# Patient Record
Sex: FEMALE | Race: WHITE | Employment: STUDENT | URBAN - METROPOLITAN AREA
[De-identification: names, ages, dates, MRNs, and addresses within clinical notes are randomized per-mention and may not be internally consistent; named-entity substitution may affect disease eponyms.]

---

## 2017-03-07 ENCOUNTER — OFFICE VISIT (OUTPATIENT)
Dept: FAMILY MEDICINE CLINIC | Age: 22
End: 2017-03-07

## 2017-03-07 VITALS
SYSTOLIC BLOOD PRESSURE: 104 MMHG | HEIGHT: 64 IN | RESPIRATION RATE: 18 BRPM | DIASTOLIC BLOOD PRESSURE: 60 MMHG | TEMPERATURE: 97.3 F | BODY MASS INDEX: 21.03 KG/M2 | HEART RATE: 94 BPM | WEIGHT: 123.2 LBS

## 2017-03-07 DIAGNOSIS — M25.571 ACUTE RIGHT ANKLE PAIN: ICD-10-CM

## 2017-03-07 DIAGNOSIS — F90.8 ATTENTION-DEFICIT HYPERACTIVITY DISORDER, OTHER TYPE: ICD-10-CM

## 2017-03-07 DIAGNOSIS — Z72.0 TOBACCO ABUSE: Primary | ICD-10-CM

## 2017-03-07 PROCEDURE — 99214 OFFICE O/P EST MOD 30 MIN: CPT | Performed by: FAMILY MEDICINE

## 2017-03-07 RX ORDER — DEXTROAMPHETAMINE SACCHARATE, AMPHETAMINE ASPARTATE MONOHYDRATE, DEXTROAMPHETAMINE SULFATE AND AMPHETAMINE SULFATE 3.75; 3.75; 3.75; 3.75 MG/1; MG/1; MG/1; MG/1
15 CAPSULE, EXTENDED RELEASE ORAL DAILY
Qty: 30 CAPSULE | Refills: 0 | Status: SHIPPED | OUTPATIENT
Start: 2017-03-07 | End: 2017-10-25 | Stop reason: SDUPTHER

## 2017-03-07 RX ORDER — NICOTINE 21 MG/24HR
1 PATCH, TRANSDERMAL 24 HOURS TRANSDERMAL EVERY 24 HOURS
Qty: 30 PATCH | Refills: 3 | Status: SHIPPED | OUTPATIENT
Start: 2017-03-07 | End: 2017-10-25

## 2017-03-07 ASSESSMENT — PATIENT HEALTH QUESTIONNAIRE - PHQ9
2. FEELING DOWN, DEPRESSED OR HOPELESS: 0
SUM OF ALL RESPONSES TO PHQ QUESTIONS 1-9: 0
1. LITTLE INTEREST OR PLEASURE IN DOING THINGS: 0
SUM OF ALL RESPONSES TO PHQ9 QUESTIONS 1 & 2: 0

## 2017-10-25 ENCOUNTER — HOSPITAL ENCOUNTER (OUTPATIENT)
Dept: GENERAL RADIOLOGY | Age: 22
Discharge: HOME | End: 2017-10-25
Payer: COMMERCIAL

## 2017-10-25 ENCOUNTER — OFFICE VISIT (OUTPATIENT)
Dept: FAMILY MEDICINE CLINIC | Age: 22
End: 2017-10-25

## 2017-10-25 VITALS
DIASTOLIC BLOOD PRESSURE: 78 MMHG | WEIGHT: 130 LBS | TEMPERATURE: 97.5 F | HEIGHT: 64 IN | BODY MASS INDEX: 22.2 KG/M2 | SYSTOLIC BLOOD PRESSURE: 122 MMHG | RESPIRATION RATE: 18 BRPM | HEART RATE: 92 BPM

## 2017-10-25 DIAGNOSIS — G89.29 CHRONIC LEFT-SIDED LOW BACK PAIN WITH LEFT-SIDED SCIATICA: ICD-10-CM

## 2017-10-25 DIAGNOSIS — Z20.2 CHLAMYDIA CONTACT, TREATED: ICD-10-CM

## 2017-10-25 DIAGNOSIS — F90.8 ATTENTION DEFICIT HYPERACTIVITY DISORDER (ADHD), OTHER TYPE: ICD-10-CM

## 2017-10-25 DIAGNOSIS — M54.42 CHRONIC LEFT-SIDED LOW BACK PAIN WITH LEFT-SIDED SCIATICA: Primary | ICD-10-CM

## 2017-10-25 DIAGNOSIS — G89.29 CHRONIC LEFT-SIDED LOW BACK PAIN WITH LEFT-SIDED SCIATICA: Primary | ICD-10-CM

## 2017-10-25 DIAGNOSIS — M54.42 CHRONIC LEFT-SIDED LOW BACK PAIN WITH LEFT-SIDED SCIATICA: ICD-10-CM

## 2017-10-25 PROCEDURE — 99214 OFFICE O/P EST MOD 30 MIN: CPT | Performed by: FAMILY MEDICINE

## 2017-10-25 PROCEDURE — 72110 X-RAY EXAM L-2 SPINE 4/>VWS: CPT

## 2017-10-25 RX ORDER — DEXTROAMPHETAMINE SACCHARATE, AMPHETAMINE ASPARTATE MONOHYDRATE, DEXTROAMPHETAMINE SULFATE AND AMPHETAMINE SULFATE 3.75; 3.75; 3.75; 3.75 MG/1; MG/1; MG/1; MG/1
15 CAPSULE, EXTENDED RELEASE ORAL DAILY
Qty: 30 CAPSULE | Refills: 0 | Status: SHIPPED | OUTPATIENT
Start: 2017-10-25 | End: 2018-01-26 | Stop reason: SDUPTHER

## 2017-10-25 NOTE — PROGRESS NOTES
Back Pain: Patient presents for presents evaluation of low back problems. Symptoms have been present for 1 year and include Initial inciting event: none. Symptoms are worst: . Alleviating factors identifiable by patient are nothing. Exacerbating factors identifiable by patient are none. Treatments so far initiated by patient: heat Previous lower back problems: none. Previous workup: none. Previous treatments: none. There is no injury. Patient also complains of white spots on her stomach. First noticed them in August. Per patient they do not itch. Her ADHD is well controlled on current medication and dose. She was recently treated for a chlamydia infection like to know if that has cleared. EXAM:  Constitutional Blood pressure 122/78, pulse 92, temperature 97.5 °F (36.4 °C), temperature source Temporal, resp. rate 18, height 5' 4\" (1.626 m), weight 130 lb (59 kg). .   Physical Exam   Constitutional: She is oriented to person, place, and time. She appears well-developed and well-nourished. Cardiovascular: Normal rate, regular rhythm and normal heart sounds. Pulmonary/Chest: Effort normal and breath sounds normal.   Musculoskeletal:   Tenderness in the inferior lumbar spine and sacroiliac joint areas. Straight leg raising negative. Neurological: She is alert and oriented to person, place, and time. DIAGNOSIS:   1. Chronic left-sided low back pain with left-sided sciatica  XR LUMBAR SPINE (MIN 4 VIEWS)    Referral Unknown External Chiropractic    Symptomatic, treat and refer for chiropractic services, Dr. Narda Nguyen. 2. Attention deficit hyperactivity disorder (ADHD), other type  amphetamine-dextroamphetamine (ADDERALL XR) 15 MG extended release capsule    amphetamine-dextroamphetamine (ADDERALL XR) 15 MG extended release capsule    amphetamine-dextroamphetamine (ADDERALL XR) 15 MG extended release capsule    Well controlled, continue current medication.    3. Chlamydia contact, treated  C.

## 2017-10-25 NOTE — Clinical Note
Please send a copy of the lumbar spine x-ray reports to Dr. Ad Arreaga, the chiropractor the patient has been referred to and will be seeing soon.

## 2017-10-31 LAB
C. TRACHOMATIS DNA ,URINE: NEGATIVE
N. GONORRHOEAE DNA, URINE: NEGATIVE

## 2018-01-13 ENCOUNTER — OFFICE VISIT (OUTPATIENT)
Dept: FAMILY MEDICINE CLINIC | Age: 23
End: 2018-01-13

## 2018-01-13 VITALS
SYSTOLIC BLOOD PRESSURE: 112 MMHG | RESPIRATION RATE: 12 BRPM | BODY MASS INDEX: 22.49 KG/M2 | OXYGEN SATURATION: 98 % | HEART RATE: 64 BPM | TEMPERATURE: 97.4 F | DIASTOLIC BLOOD PRESSURE: 78 MMHG | WEIGHT: 131 LBS

## 2018-01-13 DIAGNOSIS — H66.001 ACUTE SUPPURATIVE OTITIS MEDIA OF RIGHT EAR WITHOUT SPONTANEOUS RUPTURE OF TYMPANIC MEMBRANE, RECURRENCE NOT SPECIFIED: Primary | ICD-10-CM

## 2018-01-13 PROCEDURE — 99213 OFFICE O/P EST LOW 20 MIN: CPT | Performed by: FAMILY MEDICINE

## 2018-01-13 RX ORDER — AMOXICILLIN 875 MG/1
875 TABLET, COATED ORAL 2 TIMES DAILY
Qty: 20 TABLET | Refills: 0 | Status: SHIPPED | OUTPATIENT
Start: 2018-01-13 | End: 2018-01-23

## 2018-01-19 ENCOUNTER — OFFICE VISIT (OUTPATIENT)
Dept: FAMILY MEDICINE CLINIC | Age: 23
End: 2018-01-19

## 2018-01-19 VITALS
BODY MASS INDEX: 22.2 KG/M2 | HEART RATE: 74 BPM | DIASTOLIC BLOOD PRESSURE: 76 MMHG | WEIGHT: 130 LBS | HEIGHT: 64 IN | TEMPERATURE: 97.4 F | SYSTOLIC BLOOD PRESSURE: 114 MMHG | RESPIRATION RATE: 16 BRPM

## 2018-01-19 DIAGNOSIS — H66.001 ACUTE SUPPURATIVE OTITIS MEDIA OF RIGHT EAR WITHOUT SPONTANEOUS RUPTURE OF TYMPANIC MEMBRANE, RECURRENCE NOT SPECIFIED: Primary | ICD-10-CM

## 2018-01-19 PROCEDURE — 99213 OFFICE O/P EST LOW 20 MIN: CPT | Performed by: FAMILY MEDICINE

## 2018-01-19 RX ORDER — FLUTICASONE PROPIONATE 50 MCG
1 SPRAY, SUSPENSION (ML) NASAL DAILY
Qty: 1 BOTTLE | Refills: 1 | Status: SHIPPED | OUTPATIENT
Start: 2018-01-19 | End: 2018-08-24

## 2018-01-19 NOTE — PROGRESS NOTES
Chief Complaint   Patient presents with    Ear Fullness       HPI: Korina Wiley is a 25 y.o. female presenting for evaluation of right ear fullness. Patient states that she can't hear out of right and she feels just a little pressure. She is taking the amoxicillin but cannot hear. The swelling and pain have diminished, but she cannot hear in the right ear. EXAM:  Constitutional Blood pressure 114/76, pulse 74, temperature 97.4 °F (36.3 °C), temperature source Temporal, resp. rate 16, height 5' 4\" (1.626 m), weight 130 lb (59 kg). Physical Exam   HENT:   Head: Normocephalic. Right Ear: Ear canal normal. Tympanic membrane is erythematous and bulging. Left Ear: Ear canal normal. Tympanic membrane is not erythematous and not bulging. Mouth/Throat: Uvula is midline and oropharynx is clear and moist.   Cardiovascular: Normal rate and regular rhythm. Pulmonary/Chest: Effort normal and breath sounds normal.       DIAGNOSIS:   1. Acute suppurative otitis media of right ear without spontaneous rupture of tympanic membrane, recurrence not specified  fluticasone (FLONASE) 50 MCG/ACT nasal spray     Plan for follow up: Follow up in scheduled time with blood work as ordered. Other follow up as needed.       Electronically signed by Antonio Snow, 8:12 PM 1/21/18

## 2018-01-26 ENCOUNTER — OFFICE VISIT (OUTPATIENT)
Dept: FAMILY MEDICINE CLINIC | Age: 23
End: 2018-01-26
Payer: COMMERCIAL

## 2018-01-26 VITALS
RESPIRATION RATE: 12 BRPM | BODY MASS INDEX: 21.68 KG/M2 | WEIGHT: 127 LBS | TEMPERATURE: 97.6 F | SYSTOLIC BLOOD PRESSURE: 94 MMHG | HEIGHT: 64 IN | DIASTOLIC BLOOD PRESSURE: 58 MMHG | HEART RATE: 66 BPM

## 2018-01-26 DIAGNOSIS — F90.8 ATTENTION DEFICIT HYPERACTIVITY DISORDER (ADHD), OTHER TYPE: Primary | ICD-10-CM

## 2018-01-26 DIAGNOSIS — H66.001 ACUTE SUPPURATIVE OTITIS MEDIA OF RIGHT EAR WITHOUT SPONTANEOUS RUPTURE OF TYMPANIC MEMBRANE, RECURRENCE NOT SPECIFIED: ICD-10-CM

## 2018-01-26 PROCEDURE — 99213 OFFICE O/P EST LOW 20 MIN: CPT | Performed by: FAMILY MEDICINE

## 2018-01-26 RX ORDER — DEXTROAMPHETAMINE SACCHARATE, AMPHETAMINE ASPARTATE MONOHYDRATE, DEXTROAMPHETAMINE SULFATE AND AMPHETAMINE SULFATE 3.75; 3.75; 3.75; 3.75 MG/1; MG/1; MG/1; MG/1
15 CAPSULE, EXTENDED RELEASE ORAL DAILY
Qty: 30 CAPSULE | Refills: 0 | Status: SHIPPED | OUTPATIENT
Start: 2018-03-27 | End: 2018-08-24 | Stop reason: SDUPTHER

## 2018-01-26 RX ORDER — DEXTROAMPHETAMINE SACCHARATE, AMPHETAMINE ASPARTATE MONOHYDRATE, DEXTROAMPHETAMINE SULFATE AND AMPHETAMINE SULFATE 3.75; 3.75; 3.75; 3.75 MG/1; MG/1; MG/1; MG/1
15 CAPSULE, EXTENDED RELEASE ORAL DAILY
Qty: 30 CAPSULE | Refills: 0 | Status: SHIPPED | OUTPATIENT
Start: 2018-02-25 | End: 2018-08-24 | Stop reason: SDUPTHER

## 2018-01-26 RX ORDER — DEXTROAMPHETAMINE SACCHARATE, AMPHETAMINE ASPARTATE MONOHYDRATE, DEXTROAMPHETAMINE SULFATE AND AMPHETAMINE SULFATE 3.75; 3.75; 3.75; 3.75 MG/1; MG/1; MG/1; MG/1
15 CAPSULE, EXTENDED RELEASE ORAL DAILY
Qty: 30 CAPSULE | Refills: 0 | Status: SHIPPED | OUTPATIENT
Start: 2018-01-26 | End: 2018-08-24 | Stop reason: SDUPTHER

## 2018-01-26 RX ORDER — CLARITHROMYCIN 250 MG/1
250 TABLET, FILM COATED ORAL 2 TIMES DAILY
Qty: 20 TABLET | Refills: 0 | Status: SHIPPED | OUTPATIENT
Start: 2018-01-26 | End: 2018-02-05

## 2018-03-07 ENCOUNTER — OFFICE VISIT (OUTPATIENT)
Dept: FAMILY MEDICINE CLINIC | Age: 23
End: 2018-03-07
Payer: COMMERCIAL

## 2018-03-07 VITALS
HEIGHT: 64 IN | TEMPERATURE: 98.2 F | BODY MASS INDEX: 22.71 KG/M2 | HEART RATE: 77 BPM | DIASTOLIC BLOOD PRESSURE: 78 MMHG | RESPIRATION RATE: 14 BRPM | SYSTOLIC BLOOD PRESSURE: 118 MMHG | WEIGHT: 133 LBS

## 2018-03-07 DIAGNOSIS — F41.9 ANXIETY: ICD-10-CM

## 2018-03-07 DIAGNOSIS — F90.9 ATTENTION DEFICIT HYPERACTIVITY DISORDER (ADHD), UNSPECIFIED ADHD TYPE: Primary | ICD-10-CM

## 2018-03-07 PROCEDURE — 99213 OFFICE O/P EST LOW 20 MIN: CPT | Performed by: FAMILY MEDICINE

## 2018-08-16 ENCOUNTER — TELEPHONE (OUTPATIENT)
Dept: FAMILY MEDICINE CLINIC | Age: 23
End: 2018-08-16

## 2018-08-16 DIAGNOSIS — F90.8 ATTENTION DEFICIT HYPERACTIVITY DISORDER (ADHD), OTHER TYPE: ICD-10-CM

## 2018-08-17 RX ORDER — DEXTROAMPHETAMINE SACCHARATE, AMPHETAMINE ASPARTATE MONOHYDRATE, DEXTROAMPHETAMINE SULFATE AND AMPHETAMINE SULFATE 3.75; 3.75; 3.75; 3.75 MG/1; MG/1; MG/1; MG/1
15 CAPSULE, EXTENDED RELEASE ORAL DAILY
Qty: 30 CAPSULE | Refills: 0 | OUTPATIENT
Start: 2018-08-17 | End: 2018-09-16

## 2018-08-24 ENCOUNTER — TELEPHONE (OUTPATIENT)
Dept: FAMILY MEDICINE CLINIC | Age: 23
End: 2018-08-24

## 2018-08-24 ENCOUNTER — OFFICE VISIT (OUTPATIENT)
Dept: FAMILY MEDICINE CLINIC | Age: 23
End: 2018-08-24
Payer: COMMERCIAL

## 2018-08-24 VITALS
HEIGHT: 64 IN | TEMPERATURE: 97.2 F | SYSTOLIC BLOOD PRESSURE: 102 MMHG | RESPIRATION RATE: 12 BRPM | HEART RATE: 63 BPM | DIASTOLIC BLOOD PRESSURE: 80 MMHG | OXYGEN SATURATION: 97 % | BODY MASS INDEX: 22.2 KG/M2 | WEIGHT: 130 LBS

## 2018-08-24 DIAGNOSIS — F90.8 ATTENTION DEFICIT HYPERACTIVITY DISORDER (ADHD), OTHER TYPE: ICD-10-CM

## 2018-08-24 PROCEDURE — 99213 OFFICE O/P EST LOW 20 MIN: CPT | Performed by: FAMILY MEDICINE

## 2018-08-24 RX ORDER — DEXTROAMPHETAMINE SACCHARATE, AMPHETAMINE ASPARTATE MONOHYDRATE, DEXTROAMPHETAMINE SULFATE AND AMPHETAMINE SULFATE 3.75; 3.75; 3.75; 3.75 MG/1; MG/1; MG/1; MG/1
15 CAPSULE, EXTENDED RELEASE ORAL DAILY
Qty: 30 CAPSULE | Refills: 0 | Status: SHIPPED | OUTPATIENT
Start: 2018-09-23 | End: 2019-02-19

## 2018-08-24 RX ORDER — DEXTROAMPHETAMINE SACCHARATE, AMPHETAMINE ASPARTATE MONOHYDRATE, DEXTROAMPHETAMINE SULFATE AND AMPHETAMINE SULFATE 3.75; 3.75; 3.75; 3.75 MG/1; MG/1; MG/1; MG/1
15 CAPSULE, EXTENDED RELEASE ORAL DAILY
Qty: 30 CAPSULE | Refills: 0 | Status: SHIPPED | OUTPATIENT
Start: 2018-10-23 | End: 2019-02-19

## 2018-08-24 RX ORDER — DEXTROAMPHETAMINE SACCHARATE, AMPHETAMINE ASPARTATE MONOHYDRATE, DEXTROAMPHETAMINE SULFATE AND AMPHETAMINE SULFATE 3.75; 3.75; 3.75; 3.75 MG/1; MG/1; MG/1; MG/1
15 CAPSULE, EXTENDED RELEASE ORAL DAILY
Qty: 30 CAPSULE | Refills: 0 | Status: SHIPPED | OUTPATIENT
Start: 2018-08-24 | End: 2019-02-19

## 2018-08-24 ASSESSMENT — PATIENT HEALTH QUESTIONNAIRE - PHQ9
2. FEELING DOWN, DEPRESSED OR HOPELESS: 0
SUM OF ALL RESPONSES TO PHQ9 QUESTIONS 1 & 2: 0
1. LITTLE INTEREST OR PLEASURE IN DOING THINGS: 0
SUM OF ALL RESPONSES TO PHQ QUESTIONS 1-9: 0
SUM OF ALL RESPONSES TO PHQ QUESTIONS 1-9: 0

## 2018-12-24 ENCOUNTER — OFFICE VISIT (OUTPATIENT)
Dept: FAMILY MEDICINE CLINIC | Age: 23
End: 2018-12-24
Payer: COMMERCIAL

## 2018-12-24 VITALS
SYSTOLIC BLOOD PRESSURE: 120 MMHG | WEIGHT: 141.6 LBS | TEMPERATURE: 97.8 F | OXYGEN SATURATION: 97 % | DIASTOLIC BLOOD PRESSURE: 82 MMHG | HEIGHT: 64 IN | RESPIRATION RATE: 16 BRPM | HEART RATE: 77 BPM | BODY MASS INDEX: 24.17 KG/M2

## 2018-12-24 DIAGNOSIS — K59.1 FUNCTIONAL DIARRHEA: ICD-10-CM

## 2018-12-24 DIAGNOSIS — F41.9 ANXIETY: Primary | ICD-10-CM

## 2018-12-24 PROCEDURE — 99213 OFFICE O/P EST LOW 20 MIN: CPT | Performed by: FAMILY MEDICINE

## 2018-12-24 RX ORDER — NORGESTIMATE AND ETHINYL ESTRADIOL 0.25-0.035
KIT ORAL
Refills: 1 | COMMUNITY
Start: 2018-10-30

## 2018-12-24 NOTE — PROGRESS NOTES
Chief Complaint   Patient presents with    GI Problem    Diarrhea     HPI: Loly Andersen is a 26 y.o. female presenting for evaluation of stomach problems and diarrhea. This has been going on for 2 weeks. She feels constantly hungry and eats frequently. She feels nervous  And has a job she does not like. This may be the stress that has her eating a lot. 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 She also states that she has white spots on her skin. EXAM:  Constitutional Blood pressure 120/82, pulse 77, temperature 97.8 °F (36.6 °C), temperature source Temporal, resp. rate 16, height 5' 4\" (1.626 m), weight 141 lb 9.6 oz (64.2 kg), last menstrual period 12/03/2018, SpO2 97 %. Physical Exam   Constitutional: She is oriented to person, place, and time. She appears well-developed and well-nourished. Cardiovascular: Normal rate, regular rhythm and normal heart sounds. Pulmonary/Chest: Effort normal and breath sounds normal.   Neurological: She is alert and oriented to person, place, and time. Skin:   Several scattered small very sharply defined white skin lesions. There is no scaling. She has several lesions on her anterior chest lower abdominal wall. DIAGNOSIS:    Diagnosis Orders   1. Anxiety      Likely cause of diarrhea will treat with Trintellix 5 mg 1 by mouth daily #28 samples provided. 2. Functional diarrhea      Likely result of above, treat above and see if diarrhea resolves. If not we'll need stool studies. Plan for follow up: Follow up in 4 weeks with blood work as ordered. Other follow up as needed.       Electronically signed by Gricel Negrete, 9:14 PM 12/24/18

## 2019-01-18 ENCOUNTER — TELEPHONE (OUTPATIENT)
Dept: FAMILY MEDICINE CLINIC | Age: 24
End: 2019-01-18

## 2019-01-18 DIAGNOSIS — Z11.59 NEED FOR HEPATITIS B SCREENING TEST: Primary | ICD-10-CM

## 2019-01-21 ENCOUNTER — OFFICE VISIT (OUTPATIENT)
Dept: FAMILY MEDICINE CLINIC | Age: 24
End: 2019-01-21
Payer: COMMERCIAL

## 2019-01-21 VITALS
HEART RATE: 73 BPM | WEIGHT: 141 LBS | HEIGHT: 64 IN | SYSTOLIC BLOOD PRESSURE: 120 MMHG | RESPIRATION RATE: 14 BRPM | TEMPERATURE: 97.6 F | OXYGEN SATURATION: 98 % | BODY MASS INDEX: 24.07 KG/M2 | DIASTOLIC BLOOD PRESSURE: 74 MMHG

## 2019-01-21 DIAGNOSIS — R30.0 DYSURIA: ICD-10-CM

## 2019-01-21 DIAGNOSIS — R19.7 DIARRHEA, UNSPECIFIED TYPE: Primary | ICD-10-CM

## 2019-01-21 LAB
BILIRUBIN, POC: NEGATIVE
BLOOD URINE, POC: NEGATIVE
CLARITY, POC: CLEAR
COLOR, POC: YELLOW
GLUCOSE URINE, POC: NEGATIVE
KETONES, POC: NEGATIVE
LEUKOCYTE EST, POC: NEGATIVE
NITRITE, POC: NEGATIVE
PH, POC: 6
PROTEIN, POC: NEGATIVE
SPECIFIC GRAVITY, POC: 1.02
UROBILINOGEN, POC: 3.5

## 2019-01-21 PROCEDURE — 82270 OCCULT BLOOD FECES: CPT | Performed by: FAMILY MEDICINE

## 2019-01-21 PROCEDURE — 99213 OFFICE O/P EST LOW 20 MIN: CPT | Performed by: FAMILY MEDICINE

## 2019-01-21 PROCEDURE — 81002 URINALYSIS NONAUTO W/O SCOPE: CPT | Performed by: FAMILY MEDICINE

## 2019-01-21 ASSESSMENT — PATIENT HEALTH QUESTIONNAIRE - PHQ9
SUM OF ALL RESPONSES TO PHQ QUESTIONS 1-9: 0
SUM OF ALL RESPONSES TO PHQ QUESTIONS 1-9: 0
2. FEELING DOWN, DEPRESSED OR HOPELESS: 0
SUM OF ALL RESPONSES TO PHQ9 QUESTIONS 1 & 2: 0
1. LITTLE INTEREST OR PLEASURE IN DOING THINGS: 0

## 2019-02-19 ENCOUNTER — OFFICE VISIT (OUTPATIENT)
Dept: UROLOGY | Age: 24
End: 2019-02-19
Payer: COMMERCIAL

## 2019-02-19 VITALS
HEIGHT: 63 IN | DIASTOLIC BLOOD PRESSURE: 80 MMHG | SYSTOLIC BLOOD PRESSURE: 120 MMHG | HEART RATE: 85 BPM | BODY MASS INDEX: 23.74 KG/M2 | WEIGHT: 134 LBS

## 2019-02-19 DIAGNOSIS — R30.0 DYSURIA: Primary | ICD-10-CM

## 2019-02-19 DIAGNOSIS — R33.9 URINARY RETENTION: ICD-10-CM

## 2019-02-19 LAB
BILIRUBIN, POC: NORMAL
BLOOD URINE, POC: NORMAL
CLARITY, POC: CLEAR
COLOR, POC: YELLOW
GLUCOSE URINE, POC: NORMAL
KETONES, POC: NORMAL
LEUKOCYTE EST, POC: NORMAL
NITRITE, POC: NORMAL
PH, POC: 5.5
POST VOID RESIDUAL (PVR): 12 ML
PROTEIN, POC: NORMAL
SPECIFIC GRAVITY, POC: 1.01
UROBILINOGEN, POC: 0.2

## 2019-02-19 PROCEDURE — 99242 OFF/OP CONSLTJ NEW/EST SF 20: CPT | Performed by: UROLOGY

## 2019-02-19 PROCEDURE — 81003 URINALYSIS AUTO W/O SCOPE: CPT | Performed by: UROLOGY

## 2019-02-19 PROCEDURE — 51798 US URINE CAPACITY MEASURE: CPT | Performed by: UROLOGY

## 2019-02-20 ENCOUNTER — OFFICE VISIT (OUTPATIENT)
Dept: FAMILY MEDICINE CLINIC | Age: 24
End: 2019-02-20
Payer: COMMERCIAL

## 2019-02-20 VITALS
TEMPERATURE: 98 F | OXYGEN SATURATION: 99 % | SYSTOLIC BLOOD PRESSURE: 112 MMHG | DIASTOLIC BLOOD PRESSURE: 86 MMHG | HEIGHT: 64 IN | RESPIRATION RATE: 16 BRPM | WEIGHT: 135.8 LBS | BODY MASS INDEX: 23.18 KG/M2 | HEART RATE: 98 BPM

## 2019-02-20 DIAGNOSIS — N32.81 DETRUSOR INSTABILITY: Primary | ICD-10-CM

## 2019-02-20 PROCEDURE — 99213 OFFICE O/P EST LOW 20 MIN: CPT | Performed by: FAMILY MEDICINE

## 2019-02-20 ASSESSMENT — PATIENT HEALTH QUESTIONNAIRE - PHQ9
SUM OF ALL RESPONSES TO PHQ QUESTIONS 1-9: 0
2. FEELING DOWN, DEPRESSED OR HOPELESS: 0
SUM OF ALL RESPONSES TO PHQ QUESTIONS 1-9: 0
SUM OF ALL RESPONSES TO PHQ9 QUESTIONS 1 & 2: 0
1. LITTLE INTEREST OR PLEASURE IN DOING THINGS: 0

## 2019-02-25 ENCOUNTER — OFFICE VISIT (OUTPATIENT)
Dept: GASTROENTEROLOGY | Age: 24
End: 2019-02-25
Payer: COMMERCIAL

## 2019-02-25 VITALS
DIASTOLIC BLOOD PRESSURE: 62 MMHG | SYSTOLIC BLOOD PRESSURE: 108 MMHG | OXYGEN SATURATION: 99 % | HEIGHT: 63 IN | TEMPERATURE: 98.6 F | HEART RATE: 65 BPM | BODY MASS INDEX: 24.27 KG/M2 | WEIGHT: 137 LBS

## 2019-02-25 DIAGNOSIS — K62.5 BRIGHT RED BLOOD PER RECTUM: ICD-10-CM

## 2019-02-25 DIAGNOSIS — R10.9 ABDOMINAL CRAMPS: ICD-10-CM

## 2019-02-25 DIAGNOSIS — R19.5 LOOSE STOOLS: Primary | ICD-10-CM

## 2019-02-25 PROCEDURE — 99204 OFFICE O/P NEW MOD 45 MIN: CPT | Performed by: INTERNAL MEDICINE

## 2019-02-25 RX ORDER — WHEAT DEXTRIN 3 G/3.8 G
15 POWDER (GRAM) ORAL DAILY
Qty: 1 CAN | Refills: 3 | Status: SHIPPED | OUTPATIENT
Start: 2019-02-25

## 2019-02-25 RX ORDER — HYOSCYAMINE SULFATE 0.12 MG/1
125 TABLET SUBLINGUAL EVERY 4 HOURS PRN
Qty: 120 EACH | Refills: 3 | Status: SHIPPED | OUTPATIENT
Start: 2019-02-25

## 2019-03-11 DIAGNOSIS — Z11.59 NEED FOR HEPATITIS B SCREENING TEST: ICD-10-CM

## 2019-03-11 LAB — HBV SURFACE AB TITR SER: NORMAL MIU/ML

## 2019-03-22 ENCOUNTER — NURSE ONLY (OUTPATIENT)
Dept: FAMILY MEDICINE CLINIC | Age: 24
End: 2019-03-22
Payer: COMMERCIAL

## 2019-03-22 DIAGNOSIS — Z23 NEED FOR HEPATITIS B VACCINATION: Primary | ICD-10-CM

## 2019-03-22 PROCEDURE — 90471 IMMUNIZATION ADMIN: CPT | Performed by: FAMILY MEDICINE

## 2019-03-22 PROCEDURE — 90746 HEPB VACCINE 3 DOSE ADULT IM: CPT | Performed by: FAMILY MEDICINE

## 2019-05-24 ENCOUNTER — NURSE ONLY (OUTPATIENT)
Dept: FAMILY MEDICINE CLINIC | Age: 24
End: 2019-05-24
Payer: COMMERCIAL

## 2019-05-24 DIAGNOSIS — Z23 NEED FOR HEPATITIS B BOOSTER VACCINATION: Primary | ICD-10-CM

## 2019-05-24 PROCEDURE — 90471 IMMUNIZATION ADMIN: CPT | Performed by: INTERNAL MEDICINE

## 2019-05-24 PROCEDURE — 90746 HEPB VACCINE 3 DOSE ADULT IM: CPT | Performed by: INTERNAL MEDICINE

## 2019-05-24 NOTE — PATIENT INSTRUCTIONS
Patient Education        hepatitis B adult vaccine  Pronunciation:  HEP a NIKKI tis B a DULT VAX een  Brand:  Engerix-B, Heplisav-B, Recombivax HB Adult, Recombivax HB Dialysis Formulation  What is the most important information I should know about this vaccine? You should not receive hepatitis B vaccine if you are allergic to baker's yeast.  This vaccine will not protect against hepatitis B if you are already infected with the virus, even if you do not yet show symptoms. What is hepatitis B vaccine? Hepatitis is a serious disease caused by a virus. Hepatitis causes inflammation of the liver, vomiting, and jaundice (yellowing of the skin or eyes). Hepatitis can lead to liver cancer, cirrhosis, or death. Hepatitis B is a disease of the liver that is spread through blood or bodily fluids, sexual contact or sharing IV drug needles with an infected person, or during childbirth when the mother is infected. The hepatitis B adult vaccine is used to help prevent this disease in adults. This vaccine works by exposing you to a small amount of the virus, which causes the body to develop immunity to the disease. This vaccine will not treat an active infection that has already developed in the body. Vaccination with hepatitis B adult vaccine is recommended for all adults who are at risk of getting hepatitis B. Risk factors include: having more than one sex partner; being a homosexual male; having sexual contact with infected people; having chronic liver disease; having diabetes and being under age 61; having HIV or AIDS; using intravenous (IV) drugs; being on dialysis or receiving blood transfusions; working in an institution for developmentally disabled patients; working in healthcare or public safety and being exposed to human blood; being in [employer] or traveling to high-risk areas; and living with a person who has hepatitis B.   Like any vaccine, the hepatitis B vaccine may not provide protection from disease in every person. What should I discuss with my healthcare provider before receiving this vaccine? Hepatitis B vaccine will not protect against infection with hepatitis A, C, and E, or other viruses that affect the liver. It may also not protect against hepatitis B if you are already infected with the virus, even if you do not yet show symptoms. You should not receive this vaccine if you have ever had a life-threatening allergic reaction to any vaccine containing hepatitis B, or if you are allergic to baker's yeast.  If you have any of these other conditions, your vaccine may need to be postponed or not given at all:  · multiple sclerosis;  · kidney disease (or if you are on dialysis);  · a bleeding or blood clotting disorder such as hemophilia or easy bruising;  · an allergy to latex rubber; or  · a neurologic disorder or disease affecting the brain (or if this was a reaction to a previous vaccine). You can still receive a vaccine if you have a minor cold. In the case of a more severe illness with a fever or any type of infection, wait until you get better before receiving this vaccine. It is not known whether this vaccine will harm an unborn baby. However, if you are at a high risk for infection with hepatitis B during pregnancy, your doctor should determine whether you need this vaccine. It may not be safe to breast-feed while receiving this medicine. Ask your doctor about any risk. How is this vaccine given? This vaccine is given as an injection (shot) into a muscle. A healthcare provider will give you this injection. The hepatitis B vaccine is given in a series of shots. The booster shots are sometimes given 1 month and 6 months after the first shot. If you have a high risk of hepatitis B infection, you may be given an additional booster 1 to 2 months after the third shot. Your individual booster schedule may be different from these guidelines.  Follow your doctor's instructions or the schedule recommended by the health department of the state you live in. What happens if I miss a dose? Contact your doctor if you will miss a booster dose or if you get behind schedule. The next dose should be given as soon as possible. There is no need to start over. Be sure to receive all recommended doses of this vaccine or you may not be fully protected against disease. What happens if I overdose? An overdose of this vaccine is unlikely to occur. What should I avoid before or after receiving this vaccine? Follow your doctor's instructions about any restrictions on food, beverages, or activity. What are the possible side effects of this vaccine? Get emergency medical help if you have signs of an allergic reaction (hives, difficult breathing, swelling in your face or throat) or a severe skin reaction (fever, sore throat, burning eyes, skin pain, red or purple skin rash with blistering and peeling). You should not receive a booster vaccine if you had a life-threatening allergic reaction after the first shot. Keep track of any and all side effects you have after receiving this vaccine. When you receive a booster dose, you will need to tell the doctor if the previous shot caused any side effects. Becoming infected with hepatitis B is much more dangerous to your health than receiving this vaccine. However, like any medicine, this vaccine can cause side effects but the risk of serious side effects is extremely low. Call your doctor at once if you have:  · high fever, sore throat, and headache with a severe blistering, peeling, and red skin rash;  · changes in behavior;  · bruising or bleeding; or  · fever, swollen glands, itching, joint pain, or not feeling well. Common side effects include:  · redness, pain, swelling, or a lump where the shot was given;  · diarrhea, loss of appetite;  · headache, dizziness, weakness;  · low fever;  · feeling tired or irritable; or  · runny nose.   This is not a complete list of side effects and others may occur. Call your doctor for medical advice about side effects. You may report vaccine side effects to the [address] [place] [address] and Human Services at [phone]. What other drugs will affect hepatitis B vaccine? Other drugs may affect this vaccine, including prescription and over-the-counter medicines, vitamins, and herbal products. Tell your doctor about all your current medicines and any medicine you start or stop using. Where can I get more information? Your doctor or pharmacist can provide more information about this vaccine. Additional information is available from your local health department or the Centers for Disease Control and Prevention. Remember, keep this and all other medicines out of the reach of children, never share your medicines with others, and use this medication only for the indication prescribed. Every effort has been made to ensure that the information provided by [address] is accurate, up-to-date, and complete, but no guarantee is made to that effect. Drug information contained herein may be time sensitive. [facility] information has been compiled for use by healthcare practitioners and consumers in the [place] and therefore [facility] does not warrant that uses outside of the [place] are appropriate, unless specifically indicated otherwise. [facility]'s drug information does not endorse drugs, diagnose patients or recommend therapy. [facility] drug information is an informational resource designed to assist licensed healthcare practitioners in caring for their patients and/or to serve consumers viewing this service as a supplement to, and not a substitute for, the expertise, skill, knowledge and judgment of healthcare practitioners. The absence of a warning for a given drug or drug combination in no way should be construed to indicate that the drug or drug combination is safe, effective or appropriate for any given patient.  [employer] does not assume any responsibility for any aspect of healthcare administered with the aid of information [facility] provides. The information contained herein is not intended to cover all possible uses, directions, precautions, warnings, drug interactions, allergic reactions, or adverse effects. If you have questions about the drugs you are taking, check with your doctor, nurse or pharmacist.  Copyright 4717-7655 [address]. Version: 6.01. Revision date: 5/7/2018. Care instructions adapted under license by [facility] ([facility]). If you have questions about a medical condition or this instruction, always ask your healthcare professional. [place] [address] any warranty or liability for your use of this information.

## 2023-05-08 ENCOUNTER — OFFICE VISIT (OUTPATIENT)
Dept: PRIMARY CARE | Facility: CLINIC | Age: 28
End: 2023-05-08
Payer: COMMERCIAL

## 2023-05-08 VITALS
HEIGHT: 64 IN | TEMPERATURE: 97.2 F | BODY MASS INDEX: 26.29 KG/M2 | HEART RATE: 86 BPM | SYSTOLIC BLOOD PRESSURE: 124 MMHG | OXYGEN SATURATION: 98 % | WEIGHT: 154 LBS | RESPIRATION RATE: 14 BRPM | DIASTOLIC BLOOD PRESSURE: 70 MMHG

## 2023-05-08 DIAGNOSIS — G89.29 CHRONIC BILATERAL THORACIC BACK PAIN: ICD-10-CM

## 2023-05-08 DIAGNOSIS — M54.6 CHRONIC BILATERAL THORACIC BACK PAIN: ICD-10-CM

## 2023-05-08 DIAGNOSIS — G44.229 CHRONIC TENSION-TYPE HEADACHE, NOT INTRACTABLE: ICD-10-CM
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
## 2023-05-08 PROBLEM — R43.2 LOSS OF TASTE: Status: ACTIVE | Noted: 2023-05-08

## 2023-05-08 PROBLEM — F41.9 ANXIETY: Status: ACTIVE | Noted: 2023-05-08

## 2023-05-08 PROBLEM — L60.9 NAIL ABNORMALITY: Status: ACTIVE | Noted: 2023-05-08

## 2023-05-08 PROCEDURE — 1036F TOBACCO NON-USER: CPT | Performed by: FAMILY MEDICINE

## 2023-05-08 PROCEDURE — 99213 OFFICE O/P EST LOW 20 MIN: CPT | Performed by: FAMILY MEDICINE

## 2023-05-08 RX ORDER — ACETAMINOPHEN 500 MG
1000 TABLET ORAL EVERY 8 HOURS
COMMUNITY
Start: 2023-05-05 | End: 2023-07-20 | Stop reason: ALTCHOICE

## 2023-05-08 RX ORDER — ESCITALOPRAM OXALATE 20 MG/1
20 TABLET ORAL DAILY
COMMUNITY
End: 2023-09-14 | Stop reason: SDUPTHER

## 2023-05-08 RX ORDER — CYCLOBENZAPRINE HCL 5 MG
5 TABLET ORAL EVERY 8 HOURS
COMMUNITY
Start: 2023-05-05 | End: 2023-05-22 | Stop reason: WASHOUT
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
## 2023-05-22 ENCOUNTER — OFFICE VISIT (OUTPATIENT)
Dept: PRIMARY CARE | Facility: CLINIC | Age: 28
End: 2023-05-22
Payer: COMMERCIAL

## 2023-05-22 VITALS
HEIGHT: 64 IN | OXYGEN SATURATION: 97 % | WEIGHT: 154.8 LBS | RESPIRATION RATE: 16 BRPM | TEMPERATURE: 98.2 F | HEART RATE: 93 BPM | BODY MASS INDEX: 26.43 KG/M2 | DIASTOLIC BLOOD PRESSURE: 66 MMHG | SYSTOLIC BLOOD PRESSURE: 114 MMHG

## 2023-05-22 DIAGNOSIS — M54.6 CHRONIC BILATERAL THORACIC BACK PAIN: Primary | ICD-10-CM

## 2023-05-22 DIAGNOSIS — M54.2 CHRONIC NECK PAIN: ICD-10-CM

## 2023-05-22 DIAGNOSIS — G89.29 CHRONIC NECK PAIN: ICD-10-CM

## 2023-05-22 DIAGNOSIS — G89.29 CHRONIC BILATERAL THORACIC BACK PAIN: Primary | ICD-10-CM

## 2023-05-22 PROCEDURE — 99213 OFFICE O/P EST LOW 20 MIN: CPT | Performed by: FAMILY MEDICINE

## 2023-05-22 PROCEDURE — 1036F TOBACCO NON-USER: CPT | Performed by: FAMILY MEDICINE

## 2023-05-22 RX ORDER — METHYLPREDNISOLONE 4 MG/1
TABLET ORAL
Qty: 21 TABLET | Refills: 0 | Status: SHIPPED | OUTPATIENT
Start: 2023-05-22 | End: 2023-05-29
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
## 2023-07-17 PROBLEM — S89.90XA KNEE INJURY: Status: ACTIVE | Noted: 2023-07-17

## 2023-07-20 ENCOUNTER — LAB (OUTPATIENT)
Dept: LAB | Facility: LAB | Age: 28
End: 2023-07-20
Payer: COMMERCIAL

## 2023-07-20 ENCOUNTER — OFFICE VISIT (OUTPATIENT)
Dept: PRIMARY CARE | Facility: CLINIC | Age: 28
End: 2023-07-20
Payer: COMMERCIAL

## 2023-07-20 VITALS
WEIGHT: 153 LBS | TEMPERATURE: 97.6 F | OXYGEN SATURATION: 98 % | RESPIRATION RATE: 16 BRPM | HEIGHT: 64 IN | SYSTOLIC BLOOD PRESSURE: 127 MMHG | BODY MASS INDEX: 26.12 KG/M2 | DIASTOLIC BLOOD PRESSURE: 84 MMHG | HEART RATE: 88 BPM

## 2023-07-20 DIAGNOSIS — G89.29 CHRONIC BILATERAL THORACIC BACK PAIN: ICD-10-CM

## 2023-07-20 DIAGNOSIS — M54.6 CHRONIC BILATERAL THORACIC BACK PAIN: ICD-10-CM

## 2023-07-20 DIAGNOSIS — F41.9 ANXIETY: Primary | ICD-10-CM

## 2023-07-20 DIAGNOSIS — F41.9 ANXIETY: ICD-10-CM

## 2023-07-20 LAB
ALANINE AMINOTRANSFERASE (SGPT) (U/L) IN SER/PLAS: 27 U/L (ref 7–45)
ALBUMIN (G/DL) IN SER/PLAS: 4.9 G/DL (ref 3.4–5)
ALKALINE PHOSPHATASE (U/L) IN SER/PLAS: 49 U/L (ref 33–110)
ANION GAP IN SER/PLAS: 11 MMOL/L (ref 10–20)
ASPARTATE AMINOTRANSFERASE (SGOT) (U/L) IN SER/PLAS: 23 U/L (ref 9–39)
BASOPHILS (10*3/UL) IN BLOOD BY AUTOMATED COUNT: 0.06 X10E9/L (ref 0–0.1)
BASOPHILS/100 LEUKOCYTES IN BLOOD BY AUTOMATED COUNT: 0.7 % (ref 0–2)
BILIRUBIN TOTAL (MG/DL) IN SER/PLAS: 0.7 MG/DL (ref 0–1.2)
CALCIUM (MG/DL) IN SER/PLAS: 9.6 MG/DL (ref 8.6–10.3)
CARBON DIOXIDE, TOTAL (MMOL/L) IN SER/PLAS: 26 MMOL/L (ref 21–32)
CHLORIDE (MMOL/L) IN SER/PLAS: 106 MMOL/L (ref 98–107)
CREATININE (MG/DL) IN SER/PLAS: 0.77 MG/DL (ref 0.5–1.05)
EOSINOPHILS (10*3/UL) IN BLOOD BY AUTOMATED COUNT: 0.02 X10E9/L (ref 0–0.7)
EOSINOPHILS/100 LEUKOCYTES IN BLOOD BY AUTOMATED COUNT: 0.2 % (ref 0–6)
ERYTHROCYTE DISTRIBUTION WIDTH (RATIO) BY AUTOMATED COUNT: 12.3 % (ref 11.5–14.5)
ERYTHROCYTE MEAN CORPUSCULAR HEMOGLOBIN CONCENTRATION (G/DL) BY AUTOMATED: 33.6 G/DL (ref 32–36)
ERYTHROCYTE MEAN CORPUSCULAR VOLUME (FL) BY AUTOMATED COUNT: 89 FL (ref 80–100)
ERYTHROCYTES (10*6/UL) IN BLOOD BY AUTOMATED COUNT: 4.86 X10E12/L (ref 4–5.2)
GFR FEMALE: >90 ML/MIN/1.73M2
GLUCOSE (MG/DL) IN SER/PLAS: 82 MG/DL (ref 74–99)
HEMATOCRIT (%) IN BLOOD BY AUTOMATED COUNT: 43.1 % (ref 36–46)
HEMOGLOBIN (G/DL) IN BLOOD: 14.5 G/DL (ref 12–16)
IMMATURE GRANULOCYTES/100 LEUKOCYTES IN BLOOD BY AUTOMATED COUNT: 0.5 % (ref 0–0.9)
LEUKOCYTES (10*3/UL) IN BLOOD BY AUTOMATED COUNT: 8.2 X10E9/L (ref 4.4–11.3)
LYMPHOCYTES (10*3/UL) IN BLOOD BY AUTOMATED COUNT: 2.07 X10E9/L (ref 1.2–4.8)
LYMPHOCYTES/100 LEUKOCYTES IN BLOOD BY AUTOMATED COUNT: 25.1 % (ref 13–44)
MONOCYTES (10*3/UL) IN BLOOD BY AUTOMATED COUNT: 0.57 X10E9/L (ref 0.1–1)
MONOCYTES/100 LEUKOCYTES IN BLOOD BY AUTOMATED COUNT: 6.9 % (ref 2–10)
NEUTROPHILS (10*3/UL) IN BLOOD BY AUTOMATED COUNT: 5.48 X10E9/L (ref 1.2–7.7)
NEUTROPHILS/100 LEUKOCYTES IN BLOOD BY AUTOMATED COUNT: 66.6 % (ref 40–80)
PLATELETS (10*3/UL) IN BLOOD AUTOMATED COUNT: 282 X10E9/L (ref 150–450)
POTASSIUM (MMOL/L) IN SER/PLAS: 3.9 MMOL/L (ref 3.5–5.3)
PROTEIN TOTAL: 7.4 G/DL (ref 6.4–8.2)
SODIUM (MMOL/L) IN SER/PLAS: 139 MMOL/L (ref 136–145)
UREA NITROGEN (MG/DL) IN SER/PLAS: 12 MG/DL (ref 6–23)

## 2023-07-20 PROCEDURE — 1036F TOBACCO NON-USER: CPT | Performed by: FAMILY MEDICINE

## 2023-07-20 PROCEDURE — 99213 OFFICE O/P EST LOW 20 MIN: CPT | Performed by: FAMILY MEDICINE

## 2023-07-20 PROCEDURE — 85025 COMPLETE CBC W/AUTO DIFF WBC: CPT

## 2023-07-20 PROCEDURE — 36415 COLL VENOUS BLD VENIPUNCTURE: CPT

## 2023-07-20 PROCEDURE — 80053 COMPREHEN METABOLIC PANEL: CPT

## 2023-07-20 ASSESSMENT — ANXIETY QUESTIONNAIRES
7. FEELING AFRAID AS IF SOMETHING AWFUL MIGHT HAPPEN: NEARLY EVERY DAY
6. BECOMING EASILY ANNOYED OR IRRITABLE: NEARLY EVERY DAY
GAD7 TOTAL SCORE: 15
2. NOT BEING ABLE TO STOP OR CONTROL WORRYING: MORE THAN HALF THE DAYS
4. TROUBLE RELAXING: MORE THAN HALF THE DAYS
1. FEELING NERVOUS, ANXIOUS, OR ON EDGE: MORE THAN HALF THE DAYS
IF YOU CHECKED OFF ANY PROBLEMS ON THIS QUESTIONNAIRE, HOW DIFFICULT HAVE THESE PROBLEMS MADE IT FOR YOU TO DO YOUR WORK, TAKE CARE OF THINGS AT HOME, OR GET ALONG WITH OTHER PEOPLE: VERY DIFFICULT
5. BEING SO RESTLESS THAT IT IS HARD TO SIT STILL: SEVERAL DAYS
3. WORRYING TOO MUCH ABOUT DIFFERENT THINGS: MORE THAN HALF THE DAYS

## 2023-07-20 NOTE — PROGRESS NOTES
"Subjective   Patient ID:  Tiffany Grijalva is a 27 y.o. female patient who presents today for Anxiety, back is better.  Anxiety: Reports taking escitalopram 20 mg daily as needed but not excessively and denies side effects. The patient is not reporting any high levels of stress or difficulty sleeping. The patient is not having any panic attacks. The patient is able to function in activities of daily life.  Patient was having some back pain but is stretching and the back pain has improved.    Past Medical, Surgical, and Family History reviewed and updated in chart.     GAD7 completed.     Patient notes recently she has noticed on her Apple watch elevated HR, ranging in low to high 90's. No chest pain or palpitations.     Reviewed all medications by prescribing practitioner or clinical pharmacist (such as prescriptions, OTCs, herbal therapies and supplements) and documented in the medical record.     No Known Allergies    The patient denies having the following symptoms: chest pain, chest pressure, fever, chills, N/V/D, constipation, dizziness, headaches, SOB.    Patient Care Team:  Cabrera Rebollar MD as PCP - General  Cabrera Rebollar MD as PCP - [facility] PCP    Objective   Vitals:  /84 (BP Location: Right arm, Patient Position: Sitting)   Pulse 88   Temp 36.4 °C (97.6 °F) (Temporal)   Resp 16   Ht 1.626 m (5' 4\")   Wt 69.4 kg (153 lb)   SpO2 98%   BMI 26.26 kg/m²     Physical Exam  Vitals reviewed.   Constitutional:       Appearance: Normal appearance.   Cardiovascular:      Rate and Rhythm: Normal rate and regular rhythm.      Pulses: Normal pulses.      Heart sounds: Normal heart sounds.   Pulmonary:      Effort: Pulmonary effort is normal.      Breath sounds: Normal breath sounds.   Abdominal:      General: Abdomen is flat.      Palpations: Abdomen is soft.   Musculoskeletal:      Cervical back: Normal range of motion and neck supple.   Neurological:      Mental Status: She is alert. "       Assessment/Plan   Problem List Items Addressed This Visit       Anxiety - Primary    Current Assessment & Plan      Is well controlled, continue with current medications, escitalopram 20 mg daily.         Relevant Orders    CBC and Auto Differential (Completed)    Comprehensive Metabolic Panel (Completed)    Chronic bilateral thoracic back pain    Current Assessment & Plan      Is well controlled, continue with current stretching regimen.          This patient will follow-up in 6 months with no lab work prior. Lab work will be determined at that visit. Other follow-up will be arranged as needed.

## 2023-08-22 NOTE — PROGRESS NOTES
"Subjective   Patient ID: Tiffany Grijalva is a 28 y.o. female who presents for 6 mo fuv.    Anxiety:  The patient is presenting today for a follow up of anxiety disorder. She denies having any current suicidal and/or homicidal ideation.  Patient denies any side effects to the escitalopram.     ADHD: The patient presents for follow up on ADHD.  The patient reports that they are currently doing well. The patient is compliant with escitalopram. Patient denies any side effects to the medications.       The patient denies having the following symptoms: chest pain, chest pressure, fever, chills, N/V/D, constipation, dizziness, headaches, SOB.      Objective   Vitals:  /70   Pulse 93   Temp 36.8 °C (98.2 °F)   Resp 16   Ht 1.626 m (5' 4\")   Wt 70.4 kg (155 lb 1.6 oz)   SpO2 97%   BMI 26.62 kg/m²     Physical Exam  Vitals reviewed.   Constitutional:       Appearance: Normal appearance.   Cardiovascular:      Rate and Rhythm: Normal rate and regular rhythm.      Pulses: Normal pulses.      Heart sounds: Normal heart sounds.   Pulmonary:      Effort: Pulmonary effort is normal.      Breath sounds: Normal breath sounds.   Abdominal:      General: Abdomen is flat.      Palpations: Abdomen is soft.   Musculoskeletal:      Cervical back: Normal range of motion and neck supple.   Neurological:      Mental Status: She is alert.          Labs reviewed from : 07/20/2023 CMP, CBC, Lipid,  WNL.     Assessment/Plan   Problem List Items Addressed This Visit       Anxiety - Primary      Is well controlled, continue with current medications.          Relevant Orders    Follow Up In [facility] - PCP - Established    ADHD (attention deficit hyperactivity disorder)      Is well controlled, continue with current medications.             Follow up in: 6 months or sooner if needed without labs prior.      Scribe Attestation  By signing my name below, ILeslie Scribe   attest that this documentation has been " prepared under the direction and in the presence of Cabrera Rebollar MD.

## 2023-08-24 ENCOUNTER — OFFICE VISIT (OUTPATIENT)
Dept: PRIMARY CARE | Facility: CLINIC | Age: 28
End: 2023-08-24
Payer: COMMERCIAL

## 2023-08-24 VITALS
HEIGHT: 64 IN | DIASTOLIC BLOOD PRESSURE: 70 MMHG | OXYGEN SATURATION: 97 % | WEIGHT: 155.1 LBS | RESPIRATION RATE: 16 BRPM | TEMPERATURE: 98.2 F | HEART RATE: 93 BPM | BODY MASS INDEX: 26.48 KG/M2 | SYSTOLIC BLOOD PRESSURE: 118 MMHG

## 2023-08-24 DIAGNOSIS — F90.2 ATTENTION DEFICIT HYPERACTIVITY DISORDER (ADHD), COMBINED TYPE: ICD-10-CM

## 2023-08-24 DIAGNOSIS — F41.9 ANXIETY: Primary | ICD-10-CM

## 2023-08-24 PROCEDURE — 1036F TOBACCO NON-USER: CPT | Performed by: FAMILY MEDICINE

## 2023-08-24 PROCEDURE — 99213 OFFICE O/P EST LOW 20 MIN: CPT | Performed by: FAMILY MEDICINE
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
## 2023-09-14 ENCOUNTER — OFFICE VISIT (OUTPATIENT)
Dept: PRIMARY CARE | Facility: CLINIC | Age: 28
End: 2023-09-14
Payer: COMMERCIAL

## 2023-09-14 VITALS
OXYGEN SATURATION: 98 % | WEIGHT: 159 LBS | HEART RATE: 78 BPM | BODY MASS INDEX: 27.14 KG/M2 | HEIGHT: 64 IN | RESPIRATION RATE: 16 BRPM | TEMPERATURE: 97.7 F | SYSTOLIC BLOOD PRESSURE: 104 MMHG | DIASTOLIC BLOOD PRESSURE: 74 MMHG

## 2023-09-14 DIAGNOSIS — F41.9 ANXIETY: Primary | ICD-10-CM

## 2023-09-14 DIAGNOSIS — G89.29 CHRONIC BILATERAL THORACIC BACK PAIN: ICD-10-CM

## 2023-09-14 DIAGNOSIS — M54.6 CHRONIC BILATERAL THORACIC BACK PAIN: ICD-10-CM

## 2023-09-14 PROCEDURE — 1036F TOBACCO NON-USER: CPT | Performed by: FAMILY MEDICINE

## 2023-09-14 PROCEDURE — 99213 OFFICE O/P EST LOW 20 MIN: CPT | Performed by: FAMILY MEDICINE

## 2023-09-14 RX ORDER — BACLOFEN 10 MG/1
10 TABLET ORAL 3 TIMES DAILY
Qty: 30 TABLET | Refills: 1 | Status: SHIPPED | OUTPATIENT
Start: 2023-09-14 | End: 2023-12-20 | Stop reason: WASHOUT

## 2023-09-14 RX ORDER — ESCITALOPRAM OXALATE 20 MG/1
20 TABLET ORAL DAILY
Qty: 90 TABLET | Refills: 3 | Status: SHIPPED | OUTPATIENT
Start: 2023-09-14 | End: 2024-03-15 | Stop reason: SDUPTHER

## 2023-09-20 ENCOUNTER — APPOINTMENT (OUTPATIENT)
Dept: PRIMARY CARE | Facility: CLINIC | Age: 28
End: 2023-09-20
Payer: COMMERCIAL

## 2023-09-21 ENCOUNTER — TELEPHONE (OUTPATIENT)
Dept: PRIMARY CARE | Facility: CLINIC | Age: 28
End: 2023-09-21
Payer: COMMERCIAL

## 2023-09-21 DIAGNOSIS — G89.29 CHRONIC BILATERAL THORACIC BACK PAIN: Primary | ICD-10-CM

## 2023-09-21 DIAGNOSIS — M54.6 CHRONIC BILATERAL THORACIC BACK PAIN: Primary | ICD-10-CM

## 2023-09-21 RX ORDER — METHYLPREDNISOLONE 4 MG/1
TABLET ORAL
Qty: 21 TABLET | Refills: 0 | Status: SHIPPED | OUTPATIENT
Start: 2023-09-21 | End: 2023-09-28

## 2023-09-21 NOTE — TELEPHONE ENCOUNTER
Please advise patient's mychart message.        Good afternoon Dr. Rebollar,     I was reaching out because my lower back has been killing me still, I have been taking the tylonel & muscle relaxers. Taking muscle relaxers when I can because they do make me sleepy. It is a constant ache, flares up and hurts to bend. I have still been seeing my chiropractor twice a week. It helps, but not for long term. I made an appointment on oct 2nd, but I was wondering if you have any advice until i see you next.   Thank you in advance. Hope to hear from you soon.

## 2023-09-22 NOTE — TELEPHONE ENCOUNTER
GASTROENTEROLOGY NEW PATIENT VIDEO VISIT  IBD CLINIC VISIT     CC/REFERRING MD:        REASON FOR CONSULTATION:   No ref. provider found for   Chief Complaint   Patient presents with     New Patient     J-Pouch; possible pouchitits       HISTORY OF PRESENT ILLNESS:    Boom Jessica is a 24 year old male who is being evaluated via a billable video visit.      We did an evaluation today for possible pouchitis.  He notes that he has a history of ulcerative colitis since childhood and has previously undergone a proctocolectomy with IPAA when he was 17 years old at the [facility] in [place].  He notes that since that time he has been feeling very well until recently.  He notes that he started having up to 10 stools per day which were very loose and watery and he was having abdominal bloating and blood in his bowel movements and fecal leakage/incontinence.  He was treated with ciprofloxacin for 10 days and then this resolved his symptoms.  He reports that he is now having only 3-5 bowel movements per day which are soft and this is his baseline.  He reports that he no longer has the abdominal pain or bloating and feels very well.  He is very active and actually ran a marathon over this past weekend.  He reports that his health is otherwise good.  Not have any other medical issues and he does not have any other pertinent surgeries.  His history of colitis includes the proctocolectomy as noted which was done during a hospitalization at [place] after he lost 45 pounds and was having an ulcerative colitis flare.  He is not currently on any medications for any type of inflammatory bowel disease.  He does not use any NSAIDs.  He has not had any type of endoscopic evaluation since the time of his surgery.  He has had C. difficile in the past prior to his surgery.  He notes that he previously was on medications including Remicade and prednisone prior to undergoing his proctocolectomy.    ASSESSMENT/PLAN    Boom has a  Patient was made aware   history of ulcerative colitis and is status post proctocolectomy with IPAA.  He recently had symptoms of increased stool frequency, blood in the stool and fecal leakage/incontinence, abdominal bloating and all of the symptoms have resolved with antibiotic therapy with ciprofloxacin.  This is all consistent with a diagnosis of acute pouchitis.  Given that he is feeling very well at this point, I think that continued conservative monitoring is very reasonable.  Should he have recurrent symptoms, I would want to get some stool test to ensure this is not C. difficile and get inflammatory markers in order to evaluate for any type of chronic inflammatory condition.  He may also need a pouchoscopy at that point.  Otherwise, he does not need any further evaluation now.  We could always consider some probiotic in the future such as VSL #3 if he is developing recurrent episodes.  He is agreeable to this plan.    Exam:    General appearance:  Healthy appearing adult, in no acute distress  Eyes:  Sclera anicteric, Pupils round and reactive to light  Ears, nose, mouth and throat:  No obvious external lesions of ears and nose.  Hearing intact  Neck:  Symmetric, No obvious external lesions  Respiratory:  Normal respiration, no use of accessory muscles   MSK:  No visual upper extremity, neck or facial muscle atrophy  ABD:  No visual abdominal distention, no audible borborygmi  Skin:  No rashes or jaundice   Psychiatric:  Oriented to person, place and time, Appropriate mood and affect.   Neurologic:  Peripheral muscle function and dexterity appear to be intact      PERTINENT STUDIES have been reviewed.    Total Video Time: 22    Originating Location (pt. Location): Home    Distant Location (provider location):  [facility]     Mode of Communication:  Video Conference via Gog[employer]    Omar Conrad MD    RTC 4 months    Thank you for this consultation.  It was a pleasure to participate in the  care of this patient; please contact us with any further questions.      This note was created with voice recognition software, and while reviewed for accuracy, typos may remain.     Omar Conrad MD  Division of Gastroenterology, Hepatology and Nutrition  [facility]  [phone]
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
## 2023-10-02 ENCOUNTER — OFFICE VISIT (OUTPATIENT)
Dept: PRIMARY CARE | Facility: CLINIC | Age: 28
End: 2023-10-02
Payer: COMMERCIAL

## 2023-10-02 VITALS
WEIGHT: 155 LBS | BODY MASS INDEX: 26.46 KG/M2 | DIASTOLIC BLOOD PRESSURE: 70 MMHG | OXYGEN SATURATION: 98 % | TEMPERATURE: 97.6 F | HEART RATE: 77 BPM | SYSTOLIC BLOOD PRESSURE: 112 MMHG | HEIGHT: 64 IN | RESPIRATION RATE: 16 BRPM

## 2023-10-02 DIAGNOSIS — G89.29 CHRONIC RIGHT-SIDED LOW BACK PAIN WITHOUT SCIATICA: Primary | ICD-10-CM

## 2023-10-02 DIAGNOSIS — M54.50 CHRONIC RIGHT-SIDED LOW BACK PAIN WITHOUT SCIATICA: Primary | ICD-10-CM

## 2023-10-02 PROCEDURE — 99213 OFFICE O/P EST LOW 20 MIN: CPT | Performed by: FAMILY MEDICINE

## 2023-10-02 PROCEDURE — 1036F TOBACCO NON-USER: CPT | Performed by: FAMILY MEDICINE

## 2023-10-30 ENCOUNTER — HOSPITAL ENCOUNTER (OUTPATIENT)
Dept: RADIOLOGY | Facility: CLINIC | Age: 28
Discharge: HOME | End: 2023-10-30
Payer: COMMERCIAL

## 2023-10-30 DIAGNOSIS — M54.50 CHRONIC RIGHT-SIDED LOW BACK PAIN WITHOUT SCIATICA: ICD-10-CM

## 2023-10-30 DIAGNOSIS — G89.29 CHRONIC RIGHT-SIDED LOW BACK PAIN WITHOUT SCIATICA: ICD-10-CM

## 2023-10-30 PROCEDURE — 72148 MRI LUMBAR SPINE W/O DYE: CPT

## 2023-10-30 PROCEDURE — 72148 MRI LUMBAR SPINE W/O DYE: CPT | Performed by: RADIOLOGY

## 2023-11-02 ENCOUNTER — OFFICE VISIT (OUTPATIENT)
Dept: PRIMARY CARE | Facility: CLINIC | Age: 28
End: 2023-11-02
Payer: COMMERCIAL

## 2023-11-02 VITALS
WEIGHT: 159.7 LBS | HEIGHT: 64 IN | DIASTOLIC BLOOD PRESSURE: 82 MMHG | OXYGEN SATURATION: 97 % | RESPIRATION RATE: 16 BRPM | SYSTOLIC BLOOD PRESSURE: 110 MMHG | HEART RATE: 90 BPM | BODY MASS INDEX: 27.26 KG/M2 | TEMPERATURE: 97.4 F

## 2023-11-02 DIAGNOSIS — M51.16 SCIATICA OF LEFT SIDE DUE TO DISPLACEMENT OF LUMBAR INTERVERTEBRAL DISC: Primary | ICD-10-CM

## 2023-11-02 PROCEDURE — 99213 OFFICE O/P EST LOW 20 MIN: CPT | Performed by: FAMILY MEDICINE

## 2023-11-02 PROCEDURE — 1036F TOBACCO NON-USER: CPT | Performed by: FAMILY MEDICINE
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
## 2023-11-03 NOTE — ASSESSMENT & PLAN NOTE
Patient remains symptomatic, limping favoring the left leg.  She has had some chiropractic care and the MRI is reviewed, refer to neurosurgery.

## 2023-11-10 ENCOUNTER — TELEPHONE (OUTPATIENT)
Dept: PRIMARY CARE | Facility: CLINIC | Age: 28
End: 2023-11-10
Payer: COMMERCIAL

## 2023-12-08 ENCOUNTER — TELEPHONE (OUTPATIENT)
Dept: PRIMARY CARE | Facility: CLINIC | Age: 28
End: 2023-12-08
Payer: COMMERCIAL

## 2023-12-20 ENCOUNTER — OFFICE VISIT (OUTPATIENT)
Dept: OBSTETRICS AND GYNECOLOGY | Facility: CLINIC | Age: 28
End: 2023-12-20
Payer: COMMERCIAL

## 2023-12-20 VITALS — WEIGHT: 163.3 LBS | BODY MASS INDEX: 28.03 KG/M2 | DIASTOLIC BLOOD PRESSURE: 62 MMHG | SYSTOLIC BLOOD PRESSURE: 108 MMHG

## 2023-12-20 DIAGNOSIS — R10.2 PELVIC PAIN: ICD-10-CM

## 2023-12-20 DIAGNOSIS — Z30.011 ENCOUNTER FOR INITIAL PRESCRIPTION OF CONTRACEPTIVE PILLS: ICD-10-CM

## 2023-12-20 DIAGNOSIS — N93.9 ABNORMAL UTERINE BLEEDING (AUB): ICD-10-CM

## 2023-12-20 DIAGNOSIS — Z00.00 HEALTH CARE MAINTENANCE: Primary | ICD-10-CM

## 2023-12-20 LAB — PREGNANCY TEST URINE, POC: NEGATIVE

## 2023-12-20 PROCEDURE — 88175 CYTOPATH C/V AUTO FLUID REDO: CPT

## 2023-12-20 PROCEDURE — 87800 DETECT AGNT MULT DNA DIREC: CPT

## 2023-12-20 PROCEDURE — 87661 TRICHOMONAS VAGINALIS AMPLIF: CPT

## 2023-12-20 PROCEDURE — 1036F TOBACCO NON-USER: CPT | Performed by: OBSTETRICS & GYNECOLOGY

## 2023-12-20 PROCEDURE — 81025 URINE PREGNANCY TEST: CPT | Performed by: OBSTETRICS & GYNECOLOGY

## 2023-12-20 PROCEDURE — 99395 PREV VISIT EST AGE 18-39: CPT | Performed by: OBSTETRICS & GYNECOLOGY

## 2023-12-20 RX ORDER — NORGESTIMATE AND ETHINYL ESTRADIOL 0.25-0.035
1 KIT ORAL DAILY
Qty: 90 TABLET | Refills: 4 | Status: SHIPPED | OUTPATIENT
Start: 2023-12-20 | End: 2024-03-18 | Stop reason: WASHOUT
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
## 2023-12-21 LAB
C TRACH RRNA SPEC QL NAA+PROBE: NEGATIVE
N GONORRHOEA DNA SPEC QL PROBE+SIG AMP: NEGATIVE
T VAGINALIS RRNA SPEC QL NAA+PROBE: NEGATIVE

## 2024-01-05 ENCOUNTER — APPOINTMENT (OUTPATIENT)
Dept: NEUROSURGERY | Facility: CLINIC | Age: 29
End: 2024-01-05
Payer: COMMERCIAL

## 2024-01-13 LAB
CYTOLOGY CMNT CVX/VAG CYTO-IMP: NORMAL
LAB AP HPV GENOTYPE QUESTION: NO
LAB AP HPV HR: NORMAL
LABORATORY COMMENT REPORT: NORMAL
LMP START DATE: NORMAL
PATH REPORT.TOTAL CANCER: NORMAL

## 2024-01-17 ENCOUNTER — PROCEDURE VISIT (OUTPATIENT)
Dept: OBSTETRICS AND GYNECOLOGY | Facility: CLINIC | Age: 29
End: 2024-01-17
Payer: COMMERCIAL

## 2024-01-17 VITALS — SYSTOLIC BLOOD PRESSURE: 110 MMHG | DIASTOLIC BLOOD PRESSURE: 78 MMHG

## 2024-01-17 DIAGNOSIS — Z00.00 HEALTH CARE MAINTENANCE: ICD-10-CM

## 2024-01-17 LAB — PREGNANCY TEST URINE, POC: NEGATIVE

## 2024-01-17 PROCEDURE — 81025 URINE PREGNANCY TEST: CPT | Performed by: OBSTETRICS & GYNECOLOGY

## 2024-01-17 PROCEDURE — 90651 9VHPV VACCINE 2/3 DOSE IM: CPT | Performed by: OBSTETRICS & GYNECOLOGY

## 2024-01-17 PROCEDURE — 90471 IMMUNIZATION ADMIN: CPT | Performed by: OBSTETRICS & GYNECOLOGY
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
## 2024-01-25 ENCOUNTER — APPOINTMENT (OUTPATIENT)
Dept: PRIMARY CARE | Facility: CLINIC | Age: 29
End: 2024-01-25
Payer: COMMERCIAL

## 2024-02-19 ENCOUNTER — OFFICE VISIT (OUTPATIENT)
Dept: NEUROSURGERY | Facility: CLINIC | Age: 29
End: 2024-02-19
Payer: COMMERCIAL

## 2024-02-19 VITALS
WEIGHT: 150 LBS | HEIGHT: 64 IN | TEMPERATURE: 97.7 F | HEART RATE: 93 BPM | SYSTOLIC BLOOD PRESSURE: 118 MMHG | DIASTOLIC BLOOD PRESSURE: 80 MMHG | BODY MASS INDEX: 25.61 KG/M2

## 2024-02-19 DIAGNOSIS — M51.16 SCIATICA OF LEFT SIDE DUE TO DISPLACEMENT OF LUMBAR INTERVERTEBRAL DISC: ICD-10-CM

## 2024-02-19 DIAGNOSIS — M54.16 LUMBAR RADICULOPATHY, ACUTE: Primary | ICD-10-CM

## 2024-02-19 PROCEDURE — 1036F TOBACCO NON-USER: CPT | Performed by: STUDENT IN AN ORGANIZED HEALTH CARE EDUCATION/TRAINING PROGRAM

## 2024-02-19 PROCEDURE — 99204 OFFICE O/P NEW MOD 45 MIN: CPT | Performed by: STUDENT IN AN ORGANIZED HEALTH CARE EDUCATION/TRAINING PROGRAM

## 2024-02-19 ASSESSMENT — PATIENT HEALTH QUESTIONNAIRE - PHQ9
5. POOR APPETITE OR OVEREATING: MORE THAN HALF THE DAYS
7. TROUBLE CONCENTRATING ON THINGS, SUCH AS READING THE NEWSPAPER OR WATCHING TELEVISION: MORE THAN HALF THE DAYS
10. IF YOU CHECKED OFF ANY PROBLEMS, HOW DIFFICULT HAVE THESE PROBLEMS MADE IT FOR YOU TO DO YOUR WORK, TAKE CARE OF THINGS AT HOME, OR GET ALONG WITH OTHER PEOPLE: SOMEWHAT DIFFICULT
SUM OF ALL RESPONSES TO PHQ QUESTIONS 1-9: 15
4. FEELING TIRED OR HAVING LITTLE ENERGY: NEARLY EVERY DAY
8. MOVING OR SPEAKING SO SLOWLY THAT OTHER PEOPLE COULD HAVE NOTICED. OR THE OPPOSITE, BEING SO FIGETY OR RESTLESS THAT YOU HAVE BEEN MOVING AROUND A LOT MORE THAN USUAL: SEVERAL DAYS
2. FEELING DOWN, DEPRESSED OR HOPELESS: SEVERAL DAYS
1. LITTLE INTEREST OR PLEASURE IN DOING THINGS: MORE THAN HALF THE DAYS
3. TROUBLE FALLING OR STAYING ASLEEP: NEARLY EVERY DAY
9. THOUGHTS THAT YOU WOULD BE BETTER OFF DEAD, OR OF HURTING YOURSELF: NOT AT ALL
SUM OF ALL RESPONSES TO PHQ9 QUESTIONS 1 & 2: 3
6. FEELING BAD ABOUT YOURSELF - OR THAT YOU ARE A FAILURE OR HAVE LET YOURSELF OR YOUR FAMILY DOWN: SEVERAL DAYS

## 2024-02-19 ASSESSMENT — LIFESTYLE VARIABLES
AUDIT-C TOTAL SCORE: 2
HOW OFTEN DO YOU HAVE SIX OR MORE DRINKS ON ONE OCCASION: NEVER
SKIP TO QUESTIONS 9-10: 1
HOW OFTEN DO YOU HAVE A DRINK CONTAINING ALCOHOL: 2-4 TIMES A MONTH
HOW MANY STANDARD DRINKS CONTAINING ALCOHOL DO YOU HAVE ON A TYPICAL DAY: 1 OR 2

## 2024-02-19 ASSESSMENT — PAIN SCALES - GENERAL: PAINLEVEL: 8
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
## 2024-02-26 ENCOUNTER — APPOINTMENT (OUTPATIENT)
Dept: PRIMARY CARE | Facility: CLINIC | Age: 29
End: 2024-02-26
Payer: COMMERCIAL
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
## 2024-03-06 ENCOUNTER — APPOINTMENT (OUTPATIENT)
Dept: PHYSICAL THERAPY | Facility: CLINIC | Age: 29
End: 2024-03-06
Payer: COMMERCIAL

## 2024-03-15 DIAGNOSIS — F41.9 ANXIETY: ICD-10-CM

## 2024-03-15 RX ORDER — ESCITALOPRAM OXALATE 20 MG/1
20 TABLET ORAL DAILY
Qty: 90 TABLET | Refills: 3 | Status: SHIPPED | OUTPATIENT
Start: 2024-03-15 | End: 2024-03-26 | Stop reason: SDUPTHER

## 2024-03-15 NOTE — TELEPHONE ENCOUNTER
Recent Visits  Date Type Provider Dept   11/02/23 Office Visit Cabrera Rebollar MD Do [id number] [facility]   10/02/23 Office Visit Cabrera Rebollar MD Do [id number] [facility]   09/14/23 Office Visit Cabrera Rebollar MD Do [id number] [facility]   08/24/23 Office Visit Cabrera Rebollar MD Do [id number] [facility]   07/20/23 Office Visit Cabrera Rebollar MD Do [id number] [facility]   05/22/23 Office Visit Cabrera Rebollar MD Do [id number] [facility]   05/08/23 Office Visit Cabrera Rebollar MD Do [id number] [facility]   Showing recent visits within past 540 days and meeting all other requirements  Future Appointments  No visits were found meeting these conditions.  Showing future appointments within next 180 days and meeting all other requirements

## 2024-03-18 ENCOUNTER — CLINICAL SUPPORT (OUTPATIENT)
Dept: OBSTETRICS AND GYNECOLOGY | Facility: CLINIC | Age: 29
End: 2024-03-18
Payer: COMMERCIAL

## 2024-03-18 VITALS — WEIGHT: 150 LBS | SYSTOLIC BLOOD PRESSURE: 120 MMHG | BODY MASS INDEX: 25.75 KG/M2 | DIASTOLIC BLOOD PRESSURE: 80 MMHG

## 2024-03-18 DIAGNOSIS — Z23 NEED FOR HPV VACCINE: ICD-10-CM

## 2024-03-18 LAB — PREGNANCY TEST URINE, POC: NEGATIVE

## 2024-03-18 PROCEDURE — 81025 URINE PREGNANCY TEST: CPT | Performed by: OBSTETRICS & GYNECOLOGY

## 2024-03-18 PROCEDURE — 90651 9VHPV VACCINE 2/3 DOSE IM: CPT | Performed by: OBSTETRICS & GYNECOLOGY

## 2024-03-18 PROCEDURE — 90471 IMMUNIZATION ADMIN: CPT | Performed by: OBSTETRICS & GYNECOLOGY

## 2024-03-18 ASSESSMENT — PAIN SCALES - GENERAL: PAINLEVEL: 0-NO PAIN

## 2024-03-26 DIAGNOSIS — F41.9 ANXIETY: ICD-10-CM

## 2024-03-26 RX ORDER — ESCITALOPRAM OXALATE 20 MG/1
20 TABLET ORAL DAILY
Qty: 30 TABLET | Refills: 0 | Status: SHIPPED | OUTPATIENT
Start: 2024-03-26

## 2024-03-26 NOTE — TELEPHONE ENCOUNTER
Recent Visits  Date Type Provider Dept   11/02/23 Office Visit Cabrera Rebollar MD Do [id number] [facility]   10/02/23 Office Visit Cabrera Rebollar MD Do [id number] [facility]   09/14/23 Office Visit Cabrera Rebollar MD Do [id number] [facility]   08/24/23 Office Visit Cabrera Rebollar MD Do [id number] [facility]   07/20/23 Office Visit Cabrera Rebollar MD Do [id number] [facility]   05/22/23 Office Visit Cabrera Rebollar MD Do [id number] [facility]   05/08/23 Office Visit Cabrera Rebollar MD Do [id number] [facility]   Showing recent visits within past 540 days and meeting all other requirements  Future Appointments  No visits were found meeting these conditions.  Showing future appointments within next 180 days and meeting all other requirements       [de-identified] : ultrasound thyroid/parathyroid 10/17/19: atrophic gland\par \par blood tests 10/16/19:

## 2024-03-28 ENCOUNTER — APPOINTMENT (OUTPATIENT)
Dept: PAIN MEDICINE | Facility: CLINIC | Age: 29
End: 2024-03-28
Payer: COMMERCIAL

## 2024-04-16 DIAGNOSIS — N89.8 VAGINAL IRRITATION: Primary | ICD-10-CM

## 2024-04-16 RX ORDER — FLUCONAZOLE 150 MG/1
150 TABLET ORAL ONCE
Qty: 1 TABLET | Refills: 1 | Status: SHIPPED | OUTPATIENT
Start: 2024-04-16 | End: 2024-04-16

## 2024-04-17 ENCOUNTER — EVALUATION (OUTPATIENT)
Dept: PHYSICAL THERAPY | Facility: CLINIC | Age: 29
End: 2024-04-17
Payer: COMMERCIAL

## 2024-04-17 DIAGNOSIS — M54.16 LUMBAR RADICULOPATHY, ACUTE: ICD-10-CM

## 2024-04-17 PROCEDURE — 97110 THERAPEUTIC EXERCISES: CPT | Mod: GP | Performed by: PHYSICAL THERAPIST

## 2024-04-17 PROCEDURE — 97161 PT EVAL LOW COMPLEX 20 MIN: CPT | Mod: GP | Performed by: PHYSICAL THERAPIST

## 2024-04-17 ASSESSMENT — PAIN - FUNCTIONAL ASSESSMENT: PAIN_FUNCTIONAL_ASSESSMENT: 0-10

## 2024-04-17 ASSESSMENT — PAIN SCALES - GENERAL: PAINLEVEL_OUTOF10: 5 - MODERATE PAIN

## 2024-04-17 ASSESSMENT — PAIN DESCRIPTION - DESCRIPTORS: DESCRIPTORS: SORE;ACHING;SHARP
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
## 2024-04-18 ENCOUNTER — APPOINTMENT (OUTPATIENT)
Dept: OBSTETRICS AND GYNECOLOGY | Facility: CLINIC | Age: 29
End: 2024-04-18
Payer: COMMERCIAL

## 2024-04-19 ENCOUNTER — OFFICE VISIT (OUTPATIENT)
Dept: OBSTETRICS AND GYNECOLOGY | Facility: CLINIC | Age: 29
End: 2024-04-19
Payer: COMMERCIAL

## 2024-04-19 VITALS
BODY MASS INDEX: 26.29 KG/M2 | SYSTOLIC BLOOD PRESSURE: 114 MMHG | WEIGHT: 154 LBS | HEIGHT: 64 IN | DIASTOLIC BLOOD PRESSURE: 62 MMHG

## 2024-04-19 DIAGNOSIS — N76.0 ACUTE VAGINITIS: Primary | ICD-10-CM

## 2024-04-19 DIAGNOSIS — N89.8 VAGINAL DISCHARGE: ICD-10-CM

## 2024-04-19 PROCEDURE — 87205 SMEAR GRAM STAIN: CPT

## 2024-04-19 PROCEDURE — 1036F TOBACCO NON-USER: CPT | Performed by: MIDWIFE

## 2024-04-19 PROCEDURE — 99214 OFFICE O/P EST MOD 30 MIN: CPT | Performed by: MIDWIFE

## 2024-04-19 NOTE — PROGRESS NOTES
Subjective   Tiffany Grijalva is a 28 y.o. female who complains of vaginal discharge/itch.    HPI:  Symptoms reported: vaginal discharge and vaginal itching  Onset:  last week after augmentin use. Was prescribed diflucan and feels her symptoms are resolving but wants a repeat swab to ensure it's gone  Course: intermittent  Progression: improved    Helpful treatments:  Diflucan  Unhelpful treatments tried: none    Objective   Physical Exam:   General Appearance: alert and oriented, in no acute distress  Abdomen: soft, non-tender; bowel sounds normal; no masses, no organomegaly  Pelvic Exam: external genitalia normal, vagina normal without discharge, uterus normal size, shape, and consistency, no cervical motion tenderness, cervix normal in appearance, no adnexal masses or tenderness, and rectovaginal septum normal  Urine dipstick: not done.    Assessment/Plan   Diagnoses and all orders for this visit:  Acute vaginitis  -     Vaginitis Gram Stain For Bacterial Vaginosis + Yeast; Future  Vaginal discharge  -     Vaginitis Gram Stain For Bacterial Vaginosis + Yeast; Future

## 2024-04-20 LAB
CLUE CELLS VAG LPF-#/AREA: NORMAL /[LPF]
NUGENT SCORE: 3
YEAST VAG WET PREP-#/AREA: NORMAL

## 2024-05-09 ENCOUNTER — APPOINTMENT (OUTPATIENT)
Dept: PHYSICAL THERAPY | Facility: CLINIC | Age: 29
End: 2024-05-09
Payer: COMMERCIAL

## 2024-05-15 ENCOUNTER — TELEPHONE (OUTPATIENT)
Dept: PRIMARY CARE | Facility: CLINIC | Age: 29
End: 2024-05-15
Payer: COMMERCIAL

## 2024-05-16 ENCOUNTER — TREATMENT (OUTPATIENT)
Dept: PHYSICAL THERAPY | Facility: CLINIC | Age: 29
End: 2024-05-16
Payer: COMMERCIAL

## 2024-05-16 DIAGNOSIS — M54.16 LUMBAR RADICULOPATHY, ACUTE: Primary | ICD-10-CM

## 2024-05-16 PROCEDURE — 97110 THERAPEUTIC EXERCISES: CPT | Mod: GP | Performed by: PHYSICAL THERAPIST

## 2024-05-16 ASSESSMENT — PAIN - FUNCTIONAL ASSESSMENT: PAIN_FUNCTIONAL_ASSESSMENT: 0-10

## 2024-05-16 ASSESSMENT — PAIN SCALES - GENERAL: PAINLEVEL_OUTOF10: 0 - NO PAIN

## 2024-05-16 NOTE — PROGRESS NOTES
Physical Therapy    Physical Therapy Treatment    Patient Name: Tiffany Grijalva  MRN: [id number]    Today's Date: 5/16/2024  Time Calculation  Start Time: 1516  Stop Time: 1600  Time Calculation (min): 44 min     PT Therapeutic Procedures Time Entry  Therapeutic Exercise Time Entry: 41                 Insurance:  [employer]  20% coinsurance  40 V/Y PT/OT  PA not req  Visit: 2  POC: 8  Assessment:  HEP reviewed this date, and patient demonstrates a good understanding of her current home exercises. Therapeutic exercises performed this date target improving flexibility of the low back, while increasing strength and stability of the lumbar spine, core, and lower extremities. Patient reports intermittent tension in the low back, as well as muscular soreness in the left lower extremity throughout many activities, but she is able to complete all exercises fully. Patient reports no increase in pain following treatment session. HEP updated for additional progression outside of PT sessions. She remains below her baseline function and would benefit from additional skilled PT.   PT Assessment  PT Assessment Results: Decreased strength, Decreased range of motion, Pain  Rehab Prognosis: Good  Plan:  OP PT Plan  PT Plan: Skilled PT  Rehab Potential: Good  Plan of Care Agreement: Patient  Continue to progress strength and stability of the low back within pain tolerance    Current Problem  1. Lumbar radiculopathy, acute  Follow Up In Physical Therapy          General     General  Reason for Referral: Low back pain  Referred By: Dr. El Upton  Past Medical History Relevant to Rehab: hx of anxiety    Subjective    Patient reports that the back has been feeling better since the last time she was here. She reports that when she works out, she still feels the shooting pain down the anterior thigh and into groin. Denies pain in the back or the left lower extremity at this time.   Precautions  Precautions  Precautions Comment: hx of  "anxiety  Pain  Pain Assessment  Pain Assessment: 0-10  Pain Score: 0 - No pain  Pain Location: Back  Pain Orientation: Lower    Objective   Level pelvis at start of session    Treatments:  Therapeutic exercises:  Open book stretch x10 B  Ppt x10  DLS march L2 x15  PPT with SLR x12 B  Bridges 2x10  Clamshells RTB x15 B  Prone hip extension (knee straight/knee bent) x12 ea B  Paloff press BTT x12 B  ALT Pulldowns BTT x15 B  HS stretch with stool 2x30\" B  (41')    OP EDUCATION:     4/17/24:  Exercises  - Sidelying Open Book Thoracic Lumbar Rotation and Extension  - 2 x daily - 7 x weekly - 1 sets - 10 reps - 5 sec hold  - Supine Posterior Pelvic Tilt  - 1 x daily - 7 x weekly - 2 sets - 10 reps - 5 sec hold  - Supine March with Posterior Pelvic Tilt  - 1 x daily - 7 x weekly - 2 sets - 10 reps  - Hooklying Sequential Leg March and Lower  - 1 x daily - 7 x weekly - 2 sets - 10 reps  - Supine Bridge  - 1 x daily - 7 x weekly - 2 sets - 10 reps - 3 sec hold  - Standing Lumbar Spine Flexion Stretch Counter  - 1 x daily - 7 x weekly - 2 sets - 10 reps  5/16/24:  Exercises  - Seated Thoracic Lumbar Extension  - 1 x daily - 7 x weekly - 1 sets - 10 reps  - Clamshell with Resistance  - 1 x daily - 7 x weekly - 2 sets - 10 reps  - Standing Anti-Rotation Press with Anchored Resistance  - 1 x daily - 7 x weekly - 2 sets - 10 reps  Goals:  By discharge:  1. Patient will report and demonstrate independence with established HEP  2. Patient will demonstrate ability to bend and lift 10lb from the floor to waist height 10x consecutively without an increase in low back pain  3. Patient will increase gross hip and knee strength to >/= 4+/5 to improve their ability to stand, ambulate, lift, and perform all work duties without restrictions  4. Patent will report low back pain of </= 1/10 at rest, and no greater than 3/10 with any activity including standing, walking, bending, lifting, or work duties  5. Patient will demonstrate a decrease " in Modified Oswestry Low Back Disability Index score by 6 pts to 5/50 to meet established MCID for the outcome measure (baseline 4/17/24 11/50)  6. Patient will report a reduction in lower extremity numbness/tingling by >/= 50% to improve her ability to walk, stand for extended periods of time, and perform all work duties without limitations  7. Patient will demonstrate pain free and symmetrical AROM of the lumbar spine to improve her ability to bend, lift, carry objects, and perform all daily tasks  8. Patient will report the ability to remain standing >/= 3 hours throughout the day without pain in the low back exceeding 3/10 to improve her tolerance to work activities and duties   9. Patient will demonstrate the ability to perform a 25lb box carry 50' x2 without pain in the low back exceeding 2/10 to indicate an improved ability to perform light/moderate lifting activities at home

## 2024-05-23 ENCOUNTER — TREATMENT (OUTPATIENT)
Dept: PHYSICAL THERAPY | Facility: CLINIC | Age: 29
End: 2024-05-23
Payer: COMMERCIAL

## 2024-05-23 DIAGNOSIS — M54.16 LUMBAR RADICULOPATHY, ACUTE: Primary | ICD-10-CM

## 2024-05-23 PROCEDURE — 97110 THERAPEUTIC EXERCISES: CPT | Mod: GP | Performed by: PHYSICAL THERAPIST

## 2024-05-23 ASSESSMENT — PAIN SCALES - GENERAL: PAINLEVEL_OUTOF10: 0 - NO PAIN

## 2024-05-23 ASSESSMENT — PAIN - FUNCTIONAL ASSESSMENT: PAIN_FUNCTIONAL_ASSESSMENT: 0-10

## 2024-05-23 NOTE — PROGRESS NOTES
Physical Therapy    Physical Therapy Treatment    Patient Name: Tiffany Grijalva  MRN: [id number]    Today's Date: 5/23/2024  Time Calculation  Start Time: 1520  Stop Time: 1559  Time Calculation (min): 39 min     PT Therapeutic Procedures Time Entry  Therapeutic Exercise Time Entry: 39                 Insurance:  [employer]  20% coinsurance  40 V/Y PT/OT  PA not req  Visit: 3  POC: 8  Assessment:  PT progresses therapeutic exercises for the lumbar spine, abdominals, and lower extremities this date with good tolerance from patient. Patient denies increased pain in the low back or lower extremity, but increased tension noted in the lumbar spine following mat exercises. Patient reports mild relief following MET to correct pelvic rotation. HEP updated for additional progression outside of PT sessions. Patient instructs patient to continue to monitor her symptoms to determine any specific activity that increases her discomfort. She remains below her baseline function at this time, and would benefit from additional skilled PT.   PT Assessment  PT Assessment Results: Decreased strength, Decreased range of motion, Pain  Rehab Prognosis: Good  Plan:  OP PT Plan  PT Plan: Skilled PT  Rehab Potential: Good  Plan of Care Agreement: Patient  Continue to progress strength and stability of the low back within pain tolerance    Current Problem  1. Lumbar radiculopathy, acute  Follow Up In Physical Therapy            General     General  Reason for Referral: Low back pain  Referred By: Dr. El Upton  Past Medical History Relevant to Rehab: hx of anxiety    Subjective    Patient reports that her leg was bothering her starting on Tuesday and into yesterday. She is not 100% sure what caused the pain, but believes it could have been from walking x2 miles on her walking pad.   Precautions  Precautions  Precautions Comment: hx of anxiety  Pain  Pain Assessment  Pain Assessment: 0-10  Pain Score: 0 - No pain  Pain Location: Back  Pain  "Orientation: Lower    Objective   R posteriorly rotated innominate at start of session corrected with MET    Treatments:  Therapeutic exercises:  MET to correct R posteriorly rotated innominate  Hip abd/add vs. BlackTB/PB 15x5\" ea   Open book stretch x10 B  Ppt x10  DLS march L2 2x10  PPT with SLR x15 B  Bridges 2x10  Clamshells RTB x15 B  Prone hip extension (knee straight/knee bent) x15 ea B  Paloff press BTT x12 B NT  ALT Pulldowns BTT x15 B NT  HS stretch with stool 2x30\" B NT  Seated on PB marches/kickouts x15 ea   Seated on PB stir the pot 6# x15 CW/CCW  (39')    OP EDUCATION:     4/17/24:  Exercises  - Sidelying Open Book Thoracic Lumbar Rotation and Extension  - 2 x daily - 7 x weekly - 1 sets - 10 reps - 5 sec hold  - Supine Posterior Pelvic Tilt  - 1 x daily - 7 x weekly - 2 sets - 10 reps - 5 sec hold  - Supine March with Posterior Pelvic Tilt  - 1 x daily - 7 x weekly - 2 sets - 10 reps  - Hooklying Sequential Leg March and Lower  - 1 x daily - 7 x weekly - 2 sets - 10 reps  - Supine Bridge  - 1 x daily - 7 x weekly - 2 sets - 10 reps - 3 sec hold  - Standing Lumbar Spine Flexion Stretch Counter  - 1 x daily - 7 x weekly - 2 sets - 10 reps  5/16/24:  Exercises  - Seated Thoracic Lumbar Extension  - 1 x daily - 7 x weekly - 1 sets - 10 reps  - Clamshell with Resistance  - 1 x daily - 7 x weekly - 2 sets - 10 reps  - Standing Anti-Rotation Press with Anchored Resistance  - 1 x daily - 7 x weekly - 2 sets - 10 reps  5/23/24:  Exercises  - Hooklying Clamshell with Resistance  - 1 x daily - 7 x weekly - 1 sets - 15 reps - 5 sec hold  - Supine Hip Adduction Isometric with Ball  - 1 x daily - 7 x weekly - 1 sets - 15 reps - 5 sec hold  Goals:  By discharge:  1. Patient will report and demonstrate independence with established HEP  2. Patient will demonstrate ability to bend and lift 10lb from the floor to waist height 10x consecutively without an increase in low back pain  3. Patient will increase gross hip and " knee strength to >/= 4+/5 to improve their ability to stand, ambulate, lift, and perform all work duties without restrictions  4. Patent will report low back pain of </= 1/10 at rest, and no greater than 3/10 with any activity including standing, walking, bending, lifting, or work duties  5. Patient will demonstrate a decrease in Modified Oswestry Low Back Disability Index score by 6 pts to 5/50 to meet established MCID for the outcome measure (baseline 4/17/24 11/50)  6. Patient will report a reduction in lower extremity numbness/tingling by >/= 50% to improve her ability to walk, stand for extended periods of time, and perform all work duties without limitations  7. Patient will demonstrate pain free and symmetrical AROM of the lumbar spine to improve her ability to bend, lift, carry objects, and perform all daily tasks  8. Patient will report the ability to remain standing >/= 3 hours throughout the day without pain in the low back exceeding 3/10 to improve her tolerance to work activities and duties   9. Patient will demonstrate the ability to perform a 25lb box carry 50' x2 without pain in the low back exceeding 2/10 to indicate an improved ability to perform light/moderate lifting activities at home

## 2024-05-30 ENCOUNTER — TREATMENT (OUTPATIENT)
Dept: PHYSICAL THERAPY | Facility: CLINIC | Age: 29
End: 2024-05-30
Payer: COMMERCIAL

## 2024-05-30 DIAGNOSIS — M54.16 LUMBAR RADICULOPATHY, ACUTE: Primary | ICD-10-CM

## 2024-05-30 PROCEDURE — 97110 THERAPEUTIC EXERCISES: CPT | Mod: GP | Performed by: PHYSICAL THERAPIST

## 2024-05-30 ASSESSMENT — PAIN - FUNCTIONAL ASSESSMENT: PAIN_FUNCTIONAL_ASSESSMENT: 0-10

## 2024-05-30 ASSESSMENT — PAIN SCALES - GENERAL: PAINLEVEL_OUTOF10: 3

## 2024-05-30 NOTE — PROGRESS NOTES
Physical Therapy    Physical Therapy Treatment    Patient Name: Tiffany Grijalva  MRN: [id number]    Today's Date: 5/30/2024  Time Calculation  Start Time: 1520  Stop Time: 1605  Time Calculation (min): 45 min     PT Therapeutic Procedures Time Entry  Therapeutic Exercise Time Entry: 45                 Insurance:  [employer]  20% coinsurance  40 V/Y PT/OT  PA not req  Visit: 4  POC: 8  Assessment:  Patient able to progress difficulty, resistance, and/or repetitions for many established exercises this date but reports increased left leg pain during clamshells and quadruped rock backs resulting in the exercises being discontinued. Therapeutic exercises target improving strength and stability of the low back, lower extremities, and core muscles. Patient reports mild increase in muscle tightness of the lumbar spine during activity, but leg pain improved to 2/10 at end of session. HEP updated at end of session for additional progression outside of PT sessions. She remains below her baseline function at this time, and would benefit from additional skilled PT.    PT Assessment  PT Assessment Results: Decreased strength, Decreased range of motion, Pain  Rehab Prognosis: Good  Plan:  OP PT Plan  PT Plan: Skilled PT  Rehab Potential: Good  Plan of Care Agreement: Patient  Continue to progress strength and stability of the low back within pain tolerance    Current Problem  1. Lumbar radiculopathy, acute  Follow Up In Physical Therapy              General     General  Reason for Referral: Low back pain  Referred By: Dr. El Upton  Past Medical History Relevant to Rehab: hx of anxiety    Subjective    Patient reports that her left anterior thigh has been bothering her today after working all day. Reports that the back was feeling stiff over the past week, and made it difficult to get into a comfortable position.   Precautions  Precautions  Precautions Comment: hx of anxiety  Pain  Pain Assessment  Pain Assessment: 0-10  Pain Score:  "3  Pain Location: Leg  Pain Orientation: Left    Objective   R posteriorly rotated innominate at start of session corrected with MET    Treatments:  Therapeutic exercises:  MET to correct R posteriorly rotated innominate  Supine figure 4 to chest 3x30\" B  Hip abd/add vs. BlackTB/PB 15x5\" ea NT  Open book stretch x10 B  Ppt x10 NT  DLS march L2 2x10  PPT with SLR 2x10 B  Bridges with RSB 2x10  Clamshells GTB x15 R only (pain on L)  Prone hip extension (knee straight/knee bent) x15 ea B  Quadruped hip extension x12  Quadruped rockback 10x5\" ea (d/c due to pain)  Paloff press BTT x12 B NT  ALT Pulldowns BTT x15 B NT  HS stretch with stool 2x30\" B NT  Seated on PB marches/kickouts x15 ea  NT  Seated on PB stir the pot 6# x15 CW/CCW NT  Hip flexor stretch kneeling 3x30\"  (45')    OP EDUCATION:     4/17/24:  Exercises  - Sidelying Open Book Thoracic Lumbar Rotation and Extension  - 2 x daily - 7 x weekly - 1 sets - 10 reps - 5 sec hold  - Supine Posterior Pelvic Tilt  - 1 x daily - 7 x weekly - 2 sets - 10 reps - 5 sec hold  - Supine March with Posterior Pelvic Tilt  - 1 x daily - 7 x weekly - 2 sets - 10 reps  - Hooklying Sequential Leg March and Lower  - 1 x daily - 7 x weekly - 2 sets - 10 reps  - Supine Bridge  - 1 x daily - 7 x weekly - 2 sets - 10 reps - 3 sec hold  - Standing Lumbar Spine Flexion Stretch Counter  - 1 x daily - 7 x weekly - 2 sets - 10 reps  5/16/24:  Exercises  - Seated Thoracic Lumbar Extension  - 1 x daily - 7 x weekly - 1 sets - 10 reps  - Clamshell with Resistance  - 1 x daily - 7 x weekly - 2 sets - 10 reps  - Standing Anti-Rotation Press with Anchored Resistance  - 1 x daily - 7 x weekly - 2 sets - 10 reps  5/23/24:  Exercises  - Hooklying Clamshell with Resistance  - 1 x daily - 7 x weekly - 1 sets - 15 reps - 5 sec hold  - Supine Hip Adduction Isometric with Ball  - 1 x daily - 7 x weekly - 1 sets - 15 reps - 5 sec hold  5/30/24:  Exercises  - Half Kneeling Hip Flexor Stretch  - 2 x daily - " 7 x weekly - 1 sets - 3 reps - 30 sec hold  - Prone Hip Extension  - 1 x daily - 7 x weekly - 2 sets - 10 reps  - Prone Hip Extension with Bent Knee  - 1 x daily - 7 x weekly - 2 sets - 10 reps  Goals:  By discharge:  1. Patient will report and demonstrate independence with established HEP  2. Patient will demonstrate ability to bend and lift 10lb from the floor to waist height 10x consecutively without an increase in low back pain  3. Patient will increase gross hip and knee strength to >/= 4+/5 to improve their ability to stand, ambulate, lift, and perform all work duties without restrictions  4. Patent will report low back pain of </= 1/10 at rest, and no greater than 3/10 with any activity including standing, walking, bending, lifting, or work duties  5. Patient will demonstrate a decrease in Modified Oswestry Low Back Disability Index score by 6 pts to 5/50 to meet established MCID for the outcome measure (baseline 4/17/24 11/50)  6. Patient will report a reduction in lower extremity numbness/tingling by >/= 50% to improve her ability to walk, stand for extended periods of time, and perform all work duties without limitations  7. Patient will demonstrate pain free and symmetrical AROM of the lumbar spine to improve her ability to bend, lift, carry objects, and perform all daily tasks  8. Patient will report the ability to remain standing >/= 3 hours throughout the day without pain in the low back exceeding 3/10 to improve her tolerance to work activities and duties   9. Patient will demonstrate the ability to perform a 25lb box carry 50' x2 without pain in the low back exceeding 2/10 to indicate an improved ability to perform light/moderate lifting activities at home

## 2024-06-06 ENCOUNTER — APPOINTMENT (OUTPATIENT)
Dept: PHYSICAL THERAPY | Facility: CLINIC | Age: 29
End: 2024-06-06
Payer: COMMERCIAL

## 2024-06-13 ENCOUNTER — TREATMENT (OUTPATIENT)
Dept: PHYSICAL THERAPY | Facility: CLINIC | Age: 29
End: 2024-06-13
Payer: COMMERCIAL

## 2024-06-13 DIAGNOSIS — M54.16 LUMBAR RADICULOPATHY, ACUTE: ICD-10-CM

## 2024-06-13 PROCEDURE — 97110 THERAPEUTIC EXERCISES: CPT | Mod: GP | Performed by: PHYSICAL THERAPIST

## 2024-06-13 ASSESSMENT — PAIN - FUNCTIONAL ASSESSMENT: PAIN_FUNCTIONAL_ASSESSMENT: 0-10

## 2024-06-13 ASSESSMENT — PAIN SCALES - GENERAL: PAINLEVEL_OUTOF10: 1

## 2024-06-13 NOTE — PROGRESS NOTES
"PHYSICAL THERAPY TREATMENT    Patient name:  Tiffany Grijalva    MRN:  [id number]    :  1995    Today's Date:  24    Time Calculation  Start Time: 1529  Stop Time: 1608  Time Calculation (min): 39 min     PT Therapeutic Procedures Time Entry  Therapeutic Exercise Time Entry: 38     Referral by:  Referred By: Dr. El Upton    Diagnoses:  Diagnosis and Precautions: Low back pain    Assessment/Plan:  Therapeutic exercise performed in order to improve core strength.  Patient requires increased tactile/verbal cues with exercise in order to reduce lumbar spine stress/strain during the particular exercise performed.  Patient challenged with exercises performed in clinic secondary to core fatigue.   PT Assessment  PT Assessment Results: Decreased strength, Decreased range of motion, Pain  Rehab Prognosis: Good  OP PT Plan  PT Plan: Skilled PT  Rehab Potential: Good  Plan of Care Agreement: Patient    General Visit Information  PT  Visit  PT Received On: 24    General  Reason for Referral: Low back pain  Referred By: Dr. El Upton  Past Medical History Relevant to Rehab: hx of anxiety    Insurance:  [employer]  20% coinsurance  40 V/Y PT/OT  PA not req  Visit: 5  POC: 8    Subjective:  Patient reports that her lower back is feeling good coming in today.    Precautions  Precautions Comment: hx of anxiety    Pain:  Pain Assessment  Pain Assessment: 0-10  Pain Score: 1  Pain Location: Leg  Pain Orientation: Left    Adherence to HEP:  yes    New symptoms or any changes:  none    Treatments    Therapeutic Exercise  Therapeutic Exercise Performed: Yes  Therapeutic Exercise Activity 1: supine LTR stretch 10\"x10 reps R and L  Therapeutic Exercise Activity 2: supine bridges x 20 reps  Therapeutic Exercise Activity 3: supine posterior pelvic tilts x 20 reps  Therapeutic Exercise Activity 4: supine marching with DLS x 15 reps  Therapeutic Exercise Activity 5: supine heel walk with DLS x 15 reps  Therapeutic Exercise " "Activity 6: supine reverse crunch with DLS x 20 reps  Therapeutic Exercise Activity 7: supine air bicycle x 20 reps  Therapeutic Exercise Activity 8: supine deadbugs x 20 reps each diagonal  Therapeutic Exercise Activity 9: prone plank 10\"x10 reps  Therapeutic Exercise Activity 10: side plank on knees 10\" hold x 10 reps R and L  Therapeutic Exercise Activity 11: quadruped bird-dog x 15 reps each diagonal  Therapeutic Exercise Activity 12: TT rows silver x 20 reps  Therapeutic Exercise Activity 13: TT shoulder extension dark gray x 20 reps  Therapeutic Exercise Activity 14: TT Palloff press x 30 reps R and L, dark alvarez    Treatment  Time in clinic started at:   3:29pm  Time in clinic ended at:   4:08pm  Total time in clinic is:   39 minutes  Total timed code time is:  38 minutes    Treatment Performed Today:.   Therapeutic Exercise x 3 units  "

## 2024-06-27 ENCOUNTER — TREATMENT (OUTPATIENT)
Dept: PHYSICAL THERAPY | Facility: CLINIC | Age: 29
End: 2024-06-27
Payer: COMMERCIAL

## 2024-06-27 DIAGNOSIS — M54.16 LUMBAR RADICULOPATHY, ACUTE: Primary | ICD-10-CM

## 2024-06-27 PROCEDURE — 97110 THERAPEUTIC EXERCISES: CPT | Mod: GP | Performed by: PHYSICAL THERAPIST

## 2024-06-27 ASSESSMENT — PAIN - FUNCTIONAL ASSESSMENT: PAIN_FUNCTIONAL_ASSESSMENT: 0-10

## 2024-06-27 ASSESSMENT — PAIN SCALES - GENERAL: PAINLEVEL_OUTOF10: 0 - NO PAIN

## 2024-06-27 NOTE — PROGRESS NOTES
Physical Therapy    Physical Therapy Treatment    Patient Name: Tiffany Grijalva  MRN: [id number]    Today's Date: 6/27/2024  Time Calculation  Start Time: 1520  Stop Time: 1600  Time Calculation (min): 40 min     PT Therapeutic Procedures Time Entry  Therapeutic Exercise Time Entry: 40                 Insurance:  [employer]  20% coinsurance  40 V/Y PT/OT  PA not req  Visit: 6  POC: 8  Assessment:  Patient tolerates all treatment well this date, without reports of increased pain. Therapeutic exercises performed to improve flexibility, strength, and stability of the lower extremities, low back, and core. Patient is challenged with dead bug exercise, but is able to complete with less discomfort with cueing to engage the core muscles. Patient reports pain of 0/10 in the low back and lower extremity at end of session. Patient has expressed good return to function since start of PT episode. After discussion, PT and patient agree to schedule her next appointment in 2-3 weeks for recheck for possible discharge to [facility] vs. Extension of current POC. Patient in agreement with plan. PT encourages her to continue with her home exercises over the next two weeks.   PT Assessment  PT Assessment Results: Decreased strength, Decreased range of motion, Pain  Rehab Prognosis: Good  Plan:  OP PT Plan  PT Plan: Skilled PT  Rehab Potential: Good  Plan of Care Agreement: Patient  Continue to progress strength and stability of the low back within pain tolerance    Current Problem  1. Lumbar radiculopathy, acute  Follow Up In Physical Therapy                General     General  Reason for Referral: Low back pain  Referred By: Dr. El Upton  Past Medical History Relevant to Rehab: hx of anxiety    Subjective    Patient denies pain in the low back or into the leg at this time. Notes that she has been feeling good for the past few weeks. She did do some weighted lunges yesterday resulting in some leg pain of 4/10, but otherwise  "  Precautions  Precautions  Precautions Comment: hx of anxiety  Pain  Pain Assessment  Pain Assessment: 0-10  0-10 (Numeric) Pain Score: 0 - No pain  Pain Location: Leg  Pain Orientation: Left    Objective   Level pelvis at start of session    Treatments:  Therapeutic exercises:  Supine figure 4 to chest 3x30\" B  Hip abd/add vs. BlackTB/PB 15x5\" ea NT  Open book stretch x10 B  Bridges with RSB 3x10  Clamshells GTB x15 R only (pain on L)  Prone hip extension (knee straight/knee bent) x15 ea B NT  Quadruped hip extension x12 NT  Quadruped rockback 10x5\" ea (d/c due to pain)  Paloff press BTT x12 B NT  ALT Pulldowns BTT x15 B NT  Bird dog x15 ea   Reverse crunch 3x10  Deadbugs 2x10  Side planks on knees 10\"x10 ea   Chops BTT x15 ea   Lifts BlkTT x15 B  (40')    OP EDUCATION:     4/17/24:  Exercises  - Sidelying Open Book Thoracic Lumbar Rotation and Extension  - 2 x daily - 7 x weekly - 1 sets - 10 reps - 5 sec hold  - Supine Posterior Pelvic Tilt  - 1 x daily - 7 x weekly - 2 sets - 10 reps - 5 sec hold  - Supine March with Posterior Pelvic Tilt  - 1 x daily - 7 x weekly - 2 sets - 10 reps  - Hooklying Sequential Leg March and Lower  - 1 x daily - 7 x weekly - 2 sets - 10 reps  - Supine Bridge  - 1 x daily - 7 x weekly - 2 sets - 10 reps - 3 sec hold  - Standing Lumbar Spine Flexion Stretch Counter  - 1 x daily - 7 x weekly - 2 sets - 10 reps  5/16/24:  Exercises  - Seated Thoracic Lumbar Extension  - 1 x daily - 7 x weekly - 1 sets - 10 reps  - Clamshell with Resistance  - 1 x daily - 7 x weekly - 2 sets - 10 reps  - Standing Anti-Rotation Press with Anchored Resistance  - 1 x daily - 7 x weekly - 2 sets - 10 reps  5/23/24:  Exercises  - Hooklying Clamshell with Resistance  - 1 x daily - 7 x weekly - 1 sets - 15 reps - 5 sec hold  - Supine Hip Adduction Isometric with Ball  - 1 x daily - 7 x weekly - 1 sets - 15 reps - 5 sec hold  5/30/24:  Exercises  - Half Kneeling Hip Flexor Stretch  - 2 x daily - 7 x weekly - 1 " sets - 3 reps - 30 sec hold  - Prone Hip Extension  - 1 x daily - 7 x weekly - 2 sets - 10 reps  - Prone Hip Extension with Bent Knee  - 1 x daily - 7 x weekly - 2 sets - 10 reps  Goals:  By discharge:  1. Patient will report and demonstrate independence with established HEP  2. Patient will demonstrate ability to bend and lift 10lb from the floor to waist height 10x consecutively without an increase in low back pain  3. Patient will increase gross hip and knee strength to >/= 4+/5 to improve their ability to stand, ambulate, lift, and perform all work duties without restrictions  4. Patent will report low back pain of </= 1/10 at rest, and no greater than 3/10 with any activity including standing, walking, bending, lifting, or work duties  5. Patient will demonstrate a decrease in Modified Oswestry Low Back Disability Index score by 6 pts to 5/50 to meet established MCID for the outcome measure (baseline 4/17/24 11/50)  6. Patient will report a reduction in lower extremity numbness/tingling by >/= 50% to improve her ability to walk, stand for extended periods of time, and perform all work duties without limitations  7. Patient will demonstrate pain free and symmetrical AROM of the lumbar spine to improve her ability to bend, lift, carry objects, and perform all daily tasks  8. Patient will report the ability to remain standing >/= 3 hours throughout the day without pain in the low back exceeding 3/10 to improve her tolerance to work activities and duties   9. Patient will demonstrate the ability to perform a 25lb box carry 50' x2 without pain in the low back exceeding 2/10 to indicate an improved ability to perform light/moderate lifting activities at home

## 2024-07-11 ENCOUNTER — TREATMENT (OUTPATIENT)
Dept: PHYSICAL THERAPY | Facility: CLINIC | Age: 29
End: 2024-07-11
Payer: COMMERCIAL

## 2024-07-11 DIAGNOSIS — M54.16 LUMBAR RADICULOPATHY, ACUTE: Primary | ICD-10-CM

## 2024-07-11 PROCEDURE — 97110 THERAPEUTIC EXERCISES: CPT | Mod: GP | Performed by: PHYSICAL THERAPIST

## 2024-07-11 ASSESSMENT — PAIN SCALES - GENERAL: PAINLEVEL_OUTOF10: 0 - NO PAIN

## 2024-07-11 ASSESSMENT — PAIN - FUNCTIONAL ASSESSMENT: PAIN_FUNCTIONAL_ASSESSMENT: 0-10

## 2024-07-11 NOTE — PROGRESS NOTES
Physical Therapy    Physical Therapy Discharge    Patient Name: Tiffany Grijalva  MRN: [id number]    Today's Date: 7/11/2024  Time Calculation  Start Time: 1518  Stop Time: 1546  Time Calculation (min): 28 min     PT Therapeutic Procedures Time Entry  Therapeutic Exercise Time Entry: 25                 Insurance:  [facility] [place]  20% coinsurance  40 V/Y PT/OT  PA not req  Visit: 7  POC: 8  Assessment:  Patient has completed 7 therapy visits up to this point, and have met 8/9 established therapy goals. The patient has progressed well, and has shown improvements in pain intensity, strength, flexibility, mobility, and overall tolerance to movement. At this time, the patient remains below functional baseline with intermittent pain in the low back/lower extremity, and mild weakness of the lower extremities. PT anticipates additional progression in these areas with adherence to HEP. Patient is discharged from formal physical therapy at this time, with instructions to continue with HEP, and follow up with physician should their status change.  PT Assessment  Assessment Comment: discharge to HEP  Plan:  OP PT Plan  PT Plan: No Additional PT interventions required at this time  Plan of Care Agreement: Patient  Discharge to HEP    Current Problem  1. Lumbar radiculopathy, acute  Follow Up In Physical Therapy                  General     General  Reason for Referral: Low back pain  Referred By: Dr. El Upton  Past Medical History Relevant to Rehab: hx of anxiety    Subjective    Patient notes that she has been pain free in the back and in the hip for the last two weeks since her last appointment. Pain is a 0/10 at this time, 0/10 at worst in the last two weeks. She did get pain of 4/10 a few weeks ago at work but it subsided quickly. She can stand for greater than 3 hours at a time without pain. She notes 95% improvement in the numbness and tingling in the lower extremity.  Precautions  Precautions  Precautions Comment: hx of  "anxiety  Pain  Pain Assessment  Pain Assessment: 0-10  0-10 (Numeric) Pain Score: 0 - No pain  Pain Location: Leg  Pain Orientation: Left    Objective   Lumbar AROM:  Flexion to distal shin*  Extension limited 50%  RSB to mid thigh*  LSB to mid thigh*    LE Strength:  Hip flexion R 4+/5, L 4/5  Hip abd R 4/5, L 4/5  Hip ext R 4/5, L 4/5  Knee flexion R 4+/5, L 4+/5  Knee extension R 4+/5, L 4+/5    Lifting: pt able to lift 10lb from the floor to waist height 10x consecutively and without pain    Carrying: pt able to carry 25lb 50'x2 and without pain    DEBBIE: 0; 0%      Treatments:  Therapeutic exercises:  Obj measures and goals review  Supine figure 4 to chest 3x30\" B  Hip abd/add vs. BlackTB/PB 15x5\" ea NT  Open book stretch x10 B  Bridges with RSB 3x10  Reverse crunch 3x10  Deadbugs 2x10  Chops BTT x15 ea NT  Lifts BlkTT x15 B NT  (25')    OP EDUCATION:     4/17/24:  Exercises  - Sidelying Open Book Thoracic Lumbar Rotation and Extension  - 2 x daily - 7 x weekly - 1 sets - 10 reps - 5 sec hold  - Supine Posterior Pelvic Tilt  - 1 x daily - 7 x weekly - 2 sets - 10 reps - 5 sec hold  - Supine March with Posterior Pelvic Tilt  - 1 x daily - 7 x weekly - 2 sets - 10 reps  - Hooklying Sequential Leg March and Lower  - 1 x daily - 7 x weekly - 2 sets - 10 reps  - Supine Bridge  - 1 x daily - 7 x weekly - 2 sets - 10 reps - 3 sec hold  - Standing Lumbar Spine Flexion Stretch Counter  - 1 x daily - 7 x weekly - 2 sets - 10 reps  5/16/24:  Exercises  - Seated Thoracic Lumbar Extension  - 1 x daily - 7 x weekly - 1 sets - 10 reps  - Clamshell with Resistance  - 1 x daily - 7 x weekly - 2 sets - 10 reps  - Standing Anti-Rotation Press with Anchored Resistance  - 1 x daily - 7 x weekly - 2 sets - 10 reps  5/23/24:  Exercises  - Hooklying Clamshell with Resistance  - 1 x daily - 7 x weekly - 1 sets - 15 reps - 5 sec hold  - Supine Hip Adduction Isometric with Ball  - 1 x daily - 7 x weekly - 1 sets - 15 reps - 5 sec " hold  5/30/24:  Exercises  - Half Kneeling Hip Flexor Stretch  - 2 x daily - 7 x weekly - 1 sets - 3 reps - 30 sec hold  - Prone Hip Extension  - 1 x daily - 7 x weekly - 2 sets - 10 reps  - Prone Hip Extension with Bent Knee  - 1 x daily - 7 x weekly - 2 sets - 10 reps  Goals:  By discharge:  1. Patient will report and demonstrate independence with established HEP MET  2. Patient will demonstrate ability to bend and lift 10lb from the floor to waist height 10x consecutively without an increase in low back pain MET  3. Patient will increase gross hip and knee strength to >/= 4+/5 to improve their ability to stand, ambulate, lift, and perform all work duties without restrictions PARTIALLY MET  4. Patent will report low back pain of </= 1/10 at rest, and no greater than 3/10 with any activity including standing, walking, bending, lifting, or work duties MET  5. Patient will demonstrate a decrease in Modified Oswestry Low Back Disability Index score by 6 pts to 5/50 to meet established MCID for the outcome measure (baseline 4/17/24 11/50) MET  6. Patient will report a reduction in lower extremity numbness/tingling by >/= 50% to improve her ability to walk, stand for extended periods of time, and perform all work duties without limitations MET  7. Patient will demonstrate pain free and symmetrical AROM of the lumbar spine to improve her ability to bend, lift, carry objects, and perform all daily tasks MET  8. Patient will report the ability to remain standing >/= 3 hours throughout the day without pain in the low back exceeding 3/10 to improve her tolerance to work activities and duties MET  9. Patient will demonstrate the ability to perform a 25lb box carry 50' x2 without pain in the low back exceeding 2/10 to indicate an improved ability to perform light/moderate lifting activities at home MET

## 2024-07-18 ENCOUNTER — APPOINTMENT (OUTPATIENT)
Dept: OBSTETRICS AND GYNECOLOGY | Facility: CLINIC | Age: 29
End: 2024-07-18
Payer: COMMERCIAL

## 2024-07-18 DIAGNOSIS — Z23 NEED FOR HPV VACCINE: ICD-10-CM

## 2024-07-18 LAB — PREGNANCY TEST URINE, POC: NEGATIVE

## 2024-07-18 PROCEDURE — 81025 URINE PREGNANCY TEST: CPT | Performed by: OBSTETRICS & GYNECOLOGY

## 2024-07-18 PROCEDURE — 90471 IMMUNIZATION ADMIN: CPT | Performed by: OBSTETRICS & GYNECOLOGY

## 2024-07-18 PROCEDURE — 90651 9VHPV VACCINE 2/3 DOSE IM: CPT | Performed by: OBSTETRICS & GYNECOLOGY

## 2024-07-18 ASSESSMENT — PATIENT HEALTH QUESTIONNAIRE - PHQ9
1. LITTLE INTEREST OR PLEASURE IN DOING THINGS: NOT AT ALL
SUM OF ALL RESPONSES TO PHQ9 QUESTIONS 1 & 2: 0
2. FEELING DOWN, DEPRESSED OR HOPELESS: NOT AT ALL

## 2024-07-18 NOTE — PROGRESS NOTES
"Tiffany Grijalva is here today for Gardasil Vaccine.  This is her her 3 of the series.   She does not report complication or side effects of previous vaccinations.  \"Office supply\"   Khb=[id number]  Exp=1/26/2026  Dr. Kate was noted to be present prior to injection.   I/O HCG was completed.  VIS given.  The patient's questions/concerns were addressed.    She will follow up as needed.   Brinda CORONADO  "

## 2024-07-22 ENCOUNTER — APPOINTMENT (OUTPATIENT)
Dept: OBSTETRICS AND GYNECOLOGY | Facility: CLINIC | Age: 29
End: 2024-07-22
Payer: COMMERCIAL

## 2024-08-29 NOTE — PROGRESS NOTES
"Subjective   Patient ID:  Tiffany Grijalva is a 29 y.o. female patient who presents today for Depression (Pt would a referral to see Kisha Parra) and Obesity    Depression: Patient is presenting today for a follow up of depression. Worsening mental health, would like to establish with a psychiatrist.     Anxiety: The patient is presenting for a follow up for anxiety. She reports worsening symptoms over the last 2 months. She is struggling with sleep at night. She weaned herself off of Lexapro. She would like to establish with a psychiatrist to better manage her mood and other symptoms.     Ear fullness: Her left ear has been feeling like it is clogged. It has slightly effected her hearing on this side.     The patient denies having the following symptoms: chest pain, chest pressure, fever, chills, N/V/D, constipation, dizziness, headaches, SOB.    Objective   Vitals:  /72   Pulse 83   Temp 36.3 °C (97.4 °F)   Resp 16   Ht 1.626 m (5' 4\")   Wt 68.4 kg (150 lb 12.8 oz)   SpO2 96%   BMI 25.88 kg/m²     Physical Exam  Vitals reviewed.   Constitutional:       Appearance: Normal appearance.   HENT:      Right Ear: Tympanic membrane and ear canal normal.      Left Ear: A middle ear effusion is present. Tympanic membrane is erythematous.   Neck:      Vascular: No carotid bruit.   Cardiovascular:      Rate and Rhythm: Normal rate and regular rhythm.      Pulses: Normal pulses.      Heart sounds: Normal heart sounds.   Pulmonary:      Effort: Pulmonary effort is normal. No respiratory distress.      Breath sounds: Normal breath sounds. No wheezing.   Abdominal:      General: Abdomen is flat. There is no distension.      Palpations: Abdomen is soft. There is no mass.      Tenderness: There is no abdominal tenderness. There is no right CVA tenderness, left CVA tenderness, guarding or rebound.   Musculoskeletal:      Cervical back: Normal range of motion and neck supple. No rigidity.      Right lower leg: No " edema.      Left lower leg: No edema.   Lymphadenopathy:      Cervical: No cervical adenopathy.   Neurological:      Mental Status: She is alert.           Labs not reviewed      Assessment/Plan   Problem List Items Addressed This Visit       Anxiety    Current Assessment & Plan     Not taking Lexapro, pt self weaned. Will refer to psychiatrist for worsening symptoms.          Relevant Orders    Follow Up In [facility]    Depression - Primary    Current Assessment & Plan     Given referral to psychiatry for worsening symptoms, Rx sent for trazodone          Relevant Medications    traZODone (Desyrel) 50 mg tablet    Other Relevant Orders    Follow Up In [facility] - PCP - Established    Follow Up In [facility]       Follow up in: 3 months or sooner if needed without labs prior.    Scribe Attestation  By signing my name below, ICeleste Scribe   attest that this documentation has been prepared under the direction and in the presence of Cabrera Rebollar MD.

## 2024-08-30 ENCOUNTER — APPOINTMENT (OUTPATIENT)
Dept: PRIMARY CARE | Facility: CLINIC | Age: 29
End: 2024-08-30
Payer: COMMERCIAL

## 2024-08-30 VITALS
WEIGHT: 150.8 LBS | DIASTOLIC BLOOD PRESSURE: 72 MMHG | TEMPERATURE: 97.4 F | RESPIRATION RATE: 16 BRPM | BODY MASS INDEX: 25.74 KG/M2 | OXYGEN SATURATION: 96 % | SYSTOLIC BLOOD PRESSURE: 112 MMHG | HEART RATE: 83 BPM | HEIGHT: 64 IN

## 2024-08-30 DIAGNOSIS — F41.9 ANXIETY: ICD-10-CM

## 2024-08-30 DIAGNOSIS — F32.A DEPRESSION, UNSPECIFIED DEPRESSION TYPE: Primary | ICD-10-CM

## 2024-08-30 PROCEDURE — 1036F TOBACCO NON-USER: CPT | Performed by: FAMILY MEDICINE

## 2024-08-30 PROCEDURE — 99213 OFFICE O/P EST LOW 20 MIN: CPT | Performed by: FAMILY MEDICINE

## 2024-08-30 PROCEDURE — 3008F BODY MASS INDEX DOCD: CPT | Performed by: FAMILY MEDICINE

## 2024-08-30 RX ORDER — TRAZODONE HYDROCHLORIDE 50 MG/1
50 TABLET ORAL NIGHTLY PRN
Qty: 30 TABLET | Refills: 5 | Status: SHIPPED | OUTPATIENT
Start: 2024-08-30 | End: 2025-08-30

## 2024-09-01 ENCOUNTER — PATIENT MESSAGE (OUTPATIENT)
Dept: PRIMARY CARE | Facility: CLINIC | Age: 29
End: 2024-09-01
Payer: COMMERCIAL

## 2024-09-01 DIAGNOSIS — H66.002 NON-RECURRENT ACUTE SUPPURATIVE OTITIS MEDIA OF LEFT EAR WITHOUT SPONTANEOUS RUPTURE OF TYMPANIC MEMBRANE: Primary | ICD-10-CM

## 2024-09-04 RX ORDER — AMOXICILLIN 875 MG/1
875 TABLET, FILM COATED ORAL 2 TIMES DAILY
Qty: 20 TABLET | Refills: 0 | Status: SHIPPED | OUTPATIENT
Start: 2024-09-04 | End: 2024-09-14

## 2024-09-20 ENCOUNTER — APPOINTMENT (OUTPATIENT)
Dept: PRIMARY CARE | Facility: CLINIC | Age: 29
End: 2024-09-20
Payer: COMMERCIAL

## 2024-10-04 ENCOUNTER — APPOINTMENT (OUTPATIENT)
Dept: PRIMARY CARE | Facility: CLINIC | Age: 29
End: 2024-10-04
Payer: COMMERCIAL

## 2024-10-04 ASSESSMENT — PATIENT HEALTH QUESTIONNAIRE - PHQ9
SUM OF ALL RESPONSES TO PHQ9 QUESTIONS 1 & 2: 4
3. TROUBLE FALLING OR STAYING ASLEEP: SEVERAL DAYS
8. MOVING OR SPEAKING SO SLOWLY THAT OTHER PEOPLE COULD HAVE NOTICED. OR THE OPPOSITE, BEING SO FIGETY OR RESTLESS THAT YOU HAVE BEEN MOVING AROUND A LOT MORE THAN USUAL: NOT AT ALL
4. FEELING TIRED OR HAVING LITTLE ENERGY: MORE THAN HALF THE DAYS
7. TROUBLE CONCENTRATING ON THINGS, SUCH AS READING THE NEWSPAPER OR WATCHING TELEVISION: NOT AT ALL
5. POOR APPETITE OR OVEREATING: SEVERAL DAYS
10. IF YOU CHECKED OFF ANY PROBLEMS, HOW DIFFICULT HAVE THESE PROBLEMS MADE IT FOR YOU TO DO YOUR WORK, TAKE CARE OF THINGS AT HOME, OR GET ALONG WITH OTHER PEOPLE: VERY DIFFICULT
2. FEELING DOWN, DEPRESSED OR HOPELESS: SEVERAL DAYS
9. THOUGHTS THAT YOU WOULD BE BETTER OFF DEAD, OR OF HURTING YOURSELF: NEARLY EVERY DAY
1. LITTLE INTEREST OR PLEASURE IN DOING THINGS: NEARLY EVERY DAY
SUM OF ALL RESPONSES TO PHQ QUESTIONS 1-9: 13
6. FEELING BAD ABOUT YOURSELF - OR THAT YOU ARE A FAILURE OR HAVE LET YOURSELF OR YOUR FAMILY DOWN: MORE THAN HALF THE DAYS

## 2024-10-04 ASSESSMENT — ANXIETY QUESTIONNAIRES
7. FEELING AFRAID AS IF SOMETHING AWFUL MIGHT HAPPEN: SEVERAL DAYS
1. FEELING NERVOUS, ANXIOUS, OR ON EDGE: MORE THAN HALF THE DAYS
GAD7 TOTAL SCORE: 12
3. WORRYING TOO MUCH ABOUT DIFFERENT THINGS: NEARLY EVERY DAY
4. TROUBLE RELAXING: NOT AT ALL
IF YOU CHECKED OFF ANY PROBLEMS ON THIS QUESTIONNAIRE, HOW DIFFICULT HAVE THESE PROBLEMS MADE IT FOR YOU TO DO YOUR WORK, TAKE CARE OF THINGS AT HOME, OR GET ALONG WITH OTHER PEOPLE: SOMEWHAT DIFFICULT
2. NOT BEING ABLE TO STOP OR CONTROL WORRYING: NEARLY EVERY DAY
6. BECOMING EASILY ANNOYED OR IRRITABLE: NEARLY EVERY DAY
5. BEING SO RESTLESS THAT IT IS HARD TO SIT STILL: NOT AT ALL
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
## 2024-10-10 ENCOUNTER — DOCUMENTATION (OUTPATIENT)
Dept: BEHAVIORAL HEALTH | Facility: HOSPITAL | Age: 29
End: 2024-10-10
Payer: COMMERCIAL

## 2024-10-10 NOTE — PROGRESS NOTES
[facility] Psychiatry Consult Note     Tiffany Grijalva is a 29 y.o., referred to Collaborative Care for symptoms of depression and anxiety. I have reviewed the patient with the behavioral health manager and reviewed the patient's electronic record.    History of depression, ADHD.  Meds for ADHD in past - made her more anxious    + anhedonia, irritable, problems with initial and middle insomnia for the past few months d/t racing thoughts. All going on for the past few weeks, since returned from vacation with friends. +SI daily, saying to self she wishes she were dead. No intent, no plan. Passive suicidal thoughts.     No history of any suicide attempt, intent, plan    Lives at home with parents, mother uses EtOH, dad uses MJ    MDQ - high energy, euphoria then depression (started 4 years ago). No risky behavior.   Half-sister has Bipolar disorder, 3 people on father's side  by suicide.     Current meds: trazodone 50 mg at HS, has not tried it yet.  Past meds: lexapro 20 mg daily, self-discontinued because she thought she didn't need it anymore. It did help.     Stopped using MJ one month ago; since then, anxiety has improved.     Recommendations:   - Since lexapro has helped in the past, without triggering any symptoms of prosper, would restart at 10 mg daily, and monitor for improvement.  - Would see monthly for the first 2 months, to monitor for any manic or hypomanic symptoms emerging (talking fast, racing thoughts, decreased need for sleep, increased dangerous activities, spending too much money recklessly). If any of these symptoms emerge, discontinue lexapro and [facility].  - If suicidal ideation is increasing, would refer to IOP. If patient develops intent or plan, would arrange transportation to the nearest ER for evaluation.  - Would encourage trying trazodone, as sleep is very important to maintain mental and physical health.        Patient Health Questionnaire-9 Score: 13 (10/4/2024 10:13  AM)  AIXA-7 Total Score: 12 (10/4/2024 10:14 AM)      The above treatment considerations and suggestions are based on consultations with the patient's care manager and a review of information available in the electronic medical record. I have not personally examined the patient. All recommendations should be implemented with consideration of the patient's relevant prior history and current clinical status. Please feel free to call me with any questions about the care of this patient. I can easily be reached through [employer] or at [email or web address]

## 2024-10-11 ENCOUNTER — APPOINTMENT (OUTPATIENT)
Dept: PRIMARY CARE | Facility: CLINIC | Age: 29
End: 2024-10-11
Payer: COMMERCIAL

## 2024-10-11 ASSESSMENT — ANXIETY QUESTIONNAIRES
2. NOT BEING ABLE TO STOP OR CONTROL WORRYING: SEVERAL DAYS
GAD7 TOTAL SCORE: 12
IF YOU CHECKED OFF ANY PROBLEMS ON THIS QUESTIONNAIRE, HOW DIFFICULT HAVE THESE PROBLEMS MADE IT FOR YOU TO DO YOUR WORK, TAKE CARE OF THINGS AT HOME, OR GET ALONG WITH OTHER PEOPLE: EXTREMELY DIFFICULT
7. FEELING AFRAID AS IF SOMETHING AWFUL MIGHT HAPPEN: NOT AT ALL
5. BEING SO RESTLESS THAT IT IS HARD TO SIT STILL: MORE THAN HALF THE DAYS
3. WORRYING TOO MUCH ABOUT DIFFERENT THINGS: MORE THAN HALF THE DAYS
4. TROUBLE RELAXING: MORE THAN HALF THE DAYS
1. FEELING NERVOUS, ANXIOUS, OR ON EDGE: MORE THAN HALF THE DAYS
6. BECOMING EASILY ANNOYED OR IRRITABLE: NEARLY EVERY DAY

## 2024-10-11 ASSESSMENT — PATIENT HEALTH QUESTIONNAIRE - PHQ9
2. FEELING DOWN, DEPRESSED OR HOPELESS: SEVERAL DAYS
8. MOVING OR SPEAKING SO SLOWLY THAT OTHER PEOPLE COULD HAVE NOTICED. OR THE OPPOSITE, BEING SO FIGETY OR RESTLESS THAT YOU HAVE BEEN MOVING AROUND A LOT MORE THAN USUAL: NOT AT ALL
10. IF YOU CHECKED OFF ANY PROBLEMS, HOW DIFFICULT HAVE THESE PROBLEMS MADE IT FOR YOU TO DO YOUR WORK, TAKE CARE OF THINGS AT HOME, OR GET ALONG WITH OTHER PEOPLE: VERY DIFFICULT
1. LITTLE INTEREST OR PLEASURE IN DOING THINGS: NEARLY EVERY DAY
5. POOR APPETITE OR OVEREATING: NEARLY EVERY DAY
4. FEELING TIRED OR HAVING LITTLE ENERGY: NEARLY EVERY DAY
3. TROUBLE FALLING OR STAYING ASLEEP: NEARLY EVERY DAY
7. TROUBLE CONCENTRATING ON THINGS, SUCH AS READING THE NEWSPAPER OR WATCHING TELEVISION: NOT AT ALL
SUM OF ALL RESPONSES TO PHQ9 QUESTIONS 1 & 2: 4
6. FEELING BAD ABOUT YOURSELF - OR THAT YOU ARE A FAILURE OR HAVE LET YOURSELF OR YOUR FAMILY DOWN: NOT AT ALL
9. THOUGHTS THAT YOU WOULD BE BETTER OFF DEAD, OR OF HURTING YOURSELF: NEARLY EVERY DAY
SUM OF ALL RESPONSES TO PHQ QUESTIONS 1-9: 16

## 2024-10-11 NOTE — PROGRESS NOTES
"Collaborative Care (CoCM)  Progress Note    Type of Interaction: Virtual    Start Time: 3:02 PM    End Time: 3:42 PM    Appointment: Scheduled    Reason for Visit:   Chief Complaint   Patient presents with    Depression    Anxiety    Risk assessment and safety planning.  Review of psych consult recommendations.    Interval History / Patient Symptoms:   Pt reports feeling very depressed and daily having intrusive thoughts of \"I want to die\" but denies wanting to harm herself and that the thought of that scares her.  Pt denies having intent or a plan to harm herself.      Patient Health Questionnaire-9 Score: 16 (10/11/2024  4:03 PM)  AIXA-7 Total Score: 12 (10/11/2024  4:04 PM)    Interventions Provided: Psychoeducation, Motivational Interviewing, Develop Coping Strategies, Treatment Planning, and Conducted risk assessment and completed safety plan with patient today.  Provided her with emergency crisis resources if she feels thoughts of suicide worsen.  Processed recent family outing to [employer] and things that frustrated her there.  Provided psychiatry medication recommendations and encouraged her to reach out to PCP to discuss.      Progress Made: None    Response to Intervention: Pt participated in completing the safety plan and agreed to use this as needed.  Pt denied any intent/plan to harm herself.  Pt stated she would reach out to PCP office about medications recommended.    Plan: Review safety plan and reach out to PCP about recommendations for re-starting Lexapro.        Patient Instructions      Tiffany SAFETY PLAN  STEP 1: Warning Signs (Thoughts, images, mood, situation, behavior) that a crisis may be developing -(How do you know when safety plan should be used):  1)  “I want to die” multiple times a day, intrusive thoughts.    2)  When sleeping a lot- exhausted and no motivation whatsoever  3) Over- eating- crave junk food    STEP 2: Internal Coping strategies-Things I can do to take my mind off my " problems without contacting another person (relaxation techniques, Physical activity, watching movie, etc.):  1) Watch comfort shows  2) Scroll phone  3) Working out  How likely do you think you would be able to do one or more of these during a time of crisis? Watch tv more and work out less.    “What might prevent you from thinking of these activities or doing these activities even after you think of them? Feeling content with doing nothing can prevent at times    STEP 3: People and social settings that provide distraction:  1) Name: Sister in law- Shelly               Phone:  2) Name: Indu               Phone:  3) Place: In my room  4) Place:     STEP 4: People whom I can ask for help:  1) Name: Shelly                                            Phone:  2) Name:                                              Phone:  3) Name:                                              Phone:    STEP 5: Professionals or agencies I can contact during a crisis:   1) [facility] Crisis hotline available 24/7     Phone: 1-242.252.4784    2) [place] [facility] Lifeline         Call or Text: 838    3) Local Urgent Care / Emergency Dept:  [facility]: [address] [place] [address]      4) Behavioral Health Urgent Care: Walk-in clinic to be seen same day-link for more information-  [email or web address]  5) Clinician/Agency Name:   Lashell Mooney                 Phone: [phone]       STEP 6: Things can do / steps to take to Make the environment safe:  1)  No guns in the home  2)      The one thing that is most important to me and worth living for is: my cat  What would I say to a close friend/loved one who was feeling this way? You have people that love you.  I understand where you're coming from.  I'm here for you.      Follow Up / Next Appointment: Next appointment: 10/17/24

## 2024-10-11 NOTE — PATIENT INSTRUCTIONS
Tiffany SAFETY PLAN  STEP 1: Warning Signs (Thoughts, images, mood, situation, behavior) that a crisis may be developing -(How do you know when safety plan should be used):  1)  “I want to die” multiple times a day, intrusive thoughts.    2)  When sleeping a lot- exhausted and no motivation whatsoever  3) Over- eating- crave junk food    STEP 2: Internal Coping strategies-Things I can do to take my mind off my problems without contacting another person (relaxation techniques, Physical activity, watching movie, etc.):  1) Watch comfort shows  2) Scroll phone  3) Working out  How likely do you think you would be able to do one or more of these during a time of crisis? Watch tv more and work out less.    “What might prevent you from thinking of these activities or doing these activities even after you think of them? Feeling content with doing nothing can prevent at times    STEP 3: People and social settings that provide distraction:  1) Name: Sister in law- Shelly               Phone:  2) Name: Indu               Phone:  3) Place: In my room  4) Place:     STEP 4: People whom I can ask for help:  1) Name: Shelly                                            Phone:  2) Name:                                              Phone:  3) Name:                                              Phone:    STEP 5: Professionals or agencies I can contact during a crisis:   1) [facility] Crisis hotline available 24/7     Phone: 1-559.637.6175    2) [place] [facility] Lifeline         Call or Text: 148    3) Local Urgent Care / Emergency Dept:  [facility]: [address] [place] [address]      4) Behavioral Health Urgent Care: Walk-in clinic to be seen same day-link for more information-  [email or web address]  5) Clinician/Agency Name:   Lashell Mooney                 Phone: [phone]       STEP 6: Things can do / steps to take to Make the  environment safe:  1)  No guns in the home  2)      The one thing that is most important to me and worth living for is: my cat  What would I say to a close friend/loved one who was feeling this way? You have people that love you.  I understand where you're coming from.  I'm here for you.

## 2024-10-17 ENCOUNTER — APPOINTMENT (OUTPATIENT)
Dept: PRIMARY CARE | Facility: CLINIC | Age: 29
End: 2024-10-17
Payer: COMMERCIAL

## 2024-10-17 ASSESSMENT — PATIENT HEALTH QUESTIONNAIRE - PHQ9
10. IF YOU CHECKED OFF ANY PROBLEMS, HOW DIFFICULT HAVE THESE PROBLEMS MADE IT FOR YOU TO DO YOUR WORK, TAKE CARE OF THINGS AT HOME, OR GET ALONG WITH OTHER PEOPLE: SOMEWHAT DIFFICULT
5. POOR APPETITE OR OVEREATING: NEARLY EVERY DAY
7. TROUBLE CONCENTRATING ON THINGS, SUCH AS READING THE NEWSPAPER OR WATCHING TELEVISION: NOT AT ALL
3. TROUBLE FALLING OR STAYING ASLEEP: SEVERAL DAYS
SUM OF ALL RESPONSES TO PHQ9 QUESTIONS 1 & 2: 3
4. FEELING TIRED OR HAVING LITTLE ENERGY: MORE THAN HALF THE DAYS
6. FEELING BAD ABOUT YOURSELF - OR THAT YOU ARE A FAILURE OR HAVE LET YOURSELF OR YOUR FAMILY DOWN: SEVERAL DAYS
1. LITTLE INTEREST OR PLEASURE IN DOING THINGS: MORE THAN HALF THE DAYS
2. FEELING DOWN, DEPRESSED OR HOPELESS: SEVERAL DAYS
SUM OF ALL RESPONSES TO PHQ QUESTIONS 1-9: 13
8. MOVING OR SPEAKING SO SLOWLY THAT OTHER PEOPLE COULD HAVE NOTICED. OR THE OPPOSITE, BEING SO FIGETY OR RESTLESS THAT YOU HAVE BEEN MOVING AROUND A LOT MORE THAN USUAL: NOT AT ALL
9. THOUGHTS THAT YOU WOULD BE BETTER OFF DEAD, OR OF HURTING YOURSELF: NEARLY EVERY DAY

## 2024-10-17 ASSESSMENT — ANXIETY QUESTIONNAIRES
IF YOU CHECKED OFF ANY PROBLEMS ON THIS QUESTIONNAIRE, HOW DIFFICULT HAVE THESE PROBLEMS MADE IT FOR YOU TO DO YOUR WORK, TAKE CARE OF THINGS AT HOME, OR GET ALONG WITH OTHER PEOPLE: VERY DIFFICULT
7. FEELING AFRAID AS IF SOMETHING AWFUL MIGHT HAPPEN: NOT AT ALL
6. BECOMING EASILY ANNOYED OR IRRITABLE: MORE THAN HALF THE DAYS
GAD7 TOTAL SCORE: 10
1. FEELING NERVOUS, ANXIOUS, OR ON EDGE: MORE THAN HALF THE DAYS
3. WORRYING TOO MUCH ABOUT DIFFERENT THINGS: MORE THAN HALF THE DAYS
2. NOT BEING ABLE TO STOP OR CONTROL WORRYING: MORE THAN HALF THE DAYS
5. BEING SO RESTLESS THAT IT IS HARD TO SIT STILL: SEVERAL DAYS
4. TROUBLE RELAXING: SEVERAL DAYS

## 2024-10-17 NOTE — PROGRESS NOTES
"Collaborative Care (CoCM)  Progress Note    Type of Interaction: In Office    Start Time: 3:30 PM    End Time: 4:11 PM    Appointment: Scheduled    Reason for Visit:   Chief Complaint   Patient presents with    Depression    Anxiety    Review of screeners; risk assessment, tx planning.    Interval History / Patient Symptoms:   Pt reports doing \"ok\" and reports some improvement in sleep since last appointment since she reduced her weekend napping.  Pt reports no motivation to exercise.  Pt started Lexapro 20 mg on Oct 14.  Pt denies side effects and tolerating well since re-starting.    Patient Health Questionnaire-9 Score: 13 (10/17/2024  4:15 PM)  AIXA-7 Total Score: 10 (10/17/2024  4:16 PM)    Interventions Provided: Psychoeducation, Behavioral Activation, Motivational Interviewing, Values Exploration, Communication Training, Develop Coping Strategies, Treatment Planning, and Explore recent changes with napping that has seemed to improve sleep quality-and applauded pt for this.  Risk assessment conducted to see if any changes in pt's intent/plan associated with passive suicidal thoughts.  Explored pt's goal and psychoeducation provided on BA and how thoughts and feelings \"catch up\" a fter taking valued action.  Identified pt's personal values.  Provided relaxation skills for pt to practice over the next week.  Psychoeducation provided on communication styles.      Progress Made: Minimum    Response to Intervention: Pt participated in session and discussed low energy and lack of interest.  Pt was discussed that her independence and fitness as very important to her but hesitant to come up with a plan to be more active over the next week bc of feeling no motivation.   Pt denied any changes in suicidal thoughts but staying the same.  Pt denied having any plan or intent.  Pt stated she would be willing to practice the relaxation skills sent to her email.      Plan: Pt to practice new ways of relaxation to manage her " stress and frustration.    Patient Instructions   Patient to practice relaxation interventions sent in email.    Follow Up / Next Appointment: Next appointment: 10/24/24

## 2024-10-24 ENCOUNTER — APPOINTMENT (OUTPATIENT)
Dept: PRIMARY CARE | Facility: CLINIC | Age: 29
End: 2024-10-24
Payer: COMMERCIAL

## 2024-10-31 PROCEDURE — 99492 1ST PSYC COLLAB CARE MGMT: CPT | Performed by: FAMILY MEDICINE

## 2024-10-31 PROCEDURE — 99494 1ST/SBSQ PSYC COLLAB CARE: CPT | Performed by: FAMILY MEDICINE

## 2024-11-01 ENCOUNTER — DOCUMENTATION (OUTPATIENT)
Dept: PRIMARY CARE | Facility: CLINIC | Age: 29
End: 2024-11-01
Payer: COMMERCIAL

## 2024-11-01 DIAGNOSIS — F33.1 MODERATE EPISODE OF RECURRENT MAJOR DEPRESSIVE DISORDER: ICD-10-CM

## 2024-11-01 DIAGNOSIS — F41.9 ANXIETY: ICD-10-CM

## 2024-11-01 DIAGNOSIS — F41.9 ANXIETY: Primary | ICD-10-CM

## 2024-11-01 RX ORDER — ESCITALOPRAM OXALATE 10 MG/1
10 TABLET ORAL DAILY
Qty: 30 TABLET | Refills: 1 | Status: SHIPPED | OUTPATIENT
Start: 2024-11-01

## 2024-11-04 ENCOUNTER — TELEPHONE (OUTPATIENT)
Dept: PRIMARY CARE | Facility: CLINIC | Age: 29
End: 2024-11-04
Payer: COMMERCIAL

## 2024-11-04 NOTE — TELEPHONE ENCOUNTER
----- Message from Cabrera Rebollar sent at 11/1/2024  3:37 PM EDT -----  Please call this patient and see how she is doing.  The psychiatrist who reviewed her visit with Lashell suggested that maybe Lexapro 10 mg daily would be acceptable.  Let Vinay know that I sent a prescription for that and in case she wanted to pick it up.

## 2024-11-08 ENCOUNTER — TELEPHONE (OUTPATIENT)
Dept: PRIMARY CARE | Facility: CLINIC | Age: 29
End: 2024-11-08
Payer: COMMERCIAL

## 2024-11-22 ENCOUNTER — APPOINTMENT (OUTPATIENT)
Dept: PRIMARY CARE | Facility: CLINIC | Age: 29
End: 2024-11-22
Payer: COMMERCIAL

## 2024-11-22 ASSESSMENT — PATIENT HEALTH QUESTIONNAIRE - PHQ9
SUM OF ALL RESPONSES TO PHQ9 QUESTIONS 1 & 2: 0
2. FEELING DOWN, DEPRESSED OR HOPELESS: NOT AT ALL
6. FEELING BAD ABOUT YOURSELF - OR THAT YOU ARE A FAILURE OR HAVE LET YOURSELF OR YOUR FAMILY DOWN: NOT AT ALL
7. TROUBLE CONCENTRATING ON THINGS, SUCH AS READING THE NEWSPAPER OR WATCHING TELEVISION: NOT AT ALL
9. THOUGHTS THAT YOU WOULD BE BETTER OFF DEAD, OR OF HURTING YOURSELF: NOT AT ALL
4. FEELING TIRED OR HAVING LITTLE ENERGY: SEVERAL DAYS
5. POOR APPETITE OR OVEREATING: SEVERAL DAYS
1. LITTLE INTEREST OR PLEASURE IN DOING THINGS: NOT AT ALL
10. IF YOU CHECKED OFF ANY PROBLEMS, HOW DIFFICULT HAVE THESE PROBLEMS MADE IT FOR YOU TO DO YOUR WORK, TAKE CARE OF THINGS AT HOME, OR GET ALONG WITH OTHER PEOPLE: NOT DIFFICULT AT ALL
SUM OF ALL RESPONSES TO PHQ QUESTIONS 1-9: 2
3. TROUBLE FALLING OR STAYING ASLEEP: NOT AT ALL
8. MOVING OR SPEAKING SO SLOWLY THAT OTHER PEOPLE COULD HAVE NOTICED. OR THE OPPOSITE, BEING SO FIGETY OR RESTLESS THAT YOU HAVE BEEN MOVING AROUND A LOT MORE THAN USUAL: NOT AT ALL

## 2024-11-22 ASSESSMENT — ANXIETY QUESTIONNAIRES
IF YOU CHECKED OFF ANY PROBLEMS ON THIS QUESTIONNAIRE, HOW DIFFICULT HAVE THESE PROBLEMS MADE IT FOR YOU TO DO YOUR WORK, TAKE CARE OF THINGS AT HOME, OR GET ALONG WITH OTHER PEOPLE: NOT DIFFICULT AT ALL
4. TROUBLE RELAXING: NOT AT ALL
5. BEING SO RESTLESS THAT IT IS HARD TO SIT STILL: NOT AT ALL
7. FEELING AFRAID AS IF SOMETHING AWFUL MIGHT HAPPEN: NOT AT ALL
1. FEELING NERVOUS, ANXIOUS, OR ON EDGE: SEVERAL DAYS
3. WORRYING TOO MUCH ABOUT DIFFERENT THINGS: NOT AT ALL
6. BECOMING EASILY ANNOYED OR IRRITABLE: NOT AT ALL
2. NOT BEING ABLE TO STOP OR CONTROL WORRYING: NOT AT ALL
GAD7 TOTAL SCORE: 1

## 2024-11-22 NOTE — PROGRESS NOTES
"Collaborative Care ([facility])  Progress Note    Type of Interaction: Virtual    Start Time: 1:00 PM    End Time: 1:31 PM    Appointment: Scheduled    Reason for Visit:   Chief Complaint   Patient presents with    Depression    Anxiety    Review of PHQ/AIXA scores; tx planning.    Interval History / Patient Symptoms:   Pt reports she is doing well and taking 10 mg Lexapro.  Pt has upcoming appt with PCP on Dec. 6.  Pt reports she is sleeping better and taking time to \"focus on herself\" as to why her scores have improved so much.    Patient Health Questionnaire-9 Score: 2 (11/22/2024  1:33 PM)  AIXA-7 Total Score: 1 (11/22/2024  1:33 PM)    Interventions Provided: Psychoeducation, Behavioral Activation, Motivational Interviewing, Communication Training, Review Progress/Goals Stress Management, and Facilitated coping skills/mindfulness practices in session (box breathing and Progressive Muscle Relaxation)-sent to pt's email and encouraged her to practice daily.  Inquired if pt using communication skills discussed in previous session.    Progress Made: Significant    Response to Intervention: Pt reported she has been staying busy and \"focusing in herself\".  Pt reports she is sleeping better and taking her Lexapro each evening.  Pt participated in relaxation activities in session and discussed feeling much more relaxed and agreed to practice often.  Pt reports less irritability at work and trying to use more positive communication skills.    Plan: Maintain medications and self care efforts.      Patient Instructions   Please practice skills from session today: Progressive Muscle Relaxation, box breathing sent via email.    Follow Up / Next Appointment: Next appointment: 12/20/24  "

## 2024-11-22 NOTE — PATIENT INSTRUCTIONS
Please practice skills from session today: Progressive Muscle Relaxation, box breathing sent via email.

## 2024-11-27 ENCOUNTER — DOCUMENTATION (OUTPATIENT)
Dept: PRIMARY CARE | Facility: CLINIC | Age: 29
End: 2024-11-27
Payer: COMMERCIAL

## 2024-11-27 DIAGNOSIS — F33.1 MODERATE EPISODE OF RECURRENT MAJOR DEPRESSIVE DISORDER: ICD-10-CM

## 2024-11-27 DIAGNOSIS — F41.9 ANXIETY: Primary | ICD-10-CM

## 2024-11-27 PROCEDURE — 99493 SBSQ PSYC COLLAB CARE MGMT: CPT | Performed by: FAMILY MEDICINE

## 2024-12-06 ENCOUNTER — APPOINTMENT (OUTPATIENT)
Dept: PRIMARY CARE | Facility: CLINIC | Age: 29
End: 2024-12-06
Payer: COMMERCIAL

## 2024-12-06 DIAGNOSIS — Z23 NEED FOR IMMUNIZATION AGAINST INFLUENZA: ICD-10-CM

## 2024-12-20 ENCOUNTER — APPOINTMENT (OUTPATIENT)
Dept: PRIMARY CARE | Facility: CLINIC | Age: 29
End: 2024-12-20
Payer: COMMERCIAL

## 2024-12-20 ENCOUNTER — TELEPHONE VISIT (OUTPATIENT)
Dept: PRIMARY CARE | Facility: CLINIC | Age: 29
End: 2024-12-20

## 2024-12-20 ENCOUNTER — TELEPHONE (OUTPATIENT)
Dept: PRIMARY CARE | Facility: CLINIC | Age: 29
End: 2024-12-20

## 2024-12-20 NOTE — PROGRESS NOTES
Patient sent [facility] email stating something came up and needed to reschedule.  Appointment rescheduled to 12/27 at 10:15 AM.

## 2024-12-27 ENCOUNTER — APPOINTMENT (OUTPATIENT)
Dept: PRIMARY CARE | Facility: CLINIC | Age: 29
End: 2024-12-27
Payer: COMMERCIAL

## 2024-12-30 ENCOUNTER — TELEPHONE (OUTPATIENT)
Dept: PRIMARY CARE | Facility: CLINIC | Age: 29
End: 2024-12-30
Payer: COMMERCIAL

## 2025-01-06 NOTE — TELEPHONE ENCOUNTER
Outreach # 2: [facility] called and LVM on 1/6/25 to reschedule previous appointment in [facility].

## 2025-01-14 NOTE — TELEPHONE ENCOUNTER
Final outreach: [facility] sent email to patient on 1/14/25 to see if patient is interested in scheduling another [facility] appointment.

## 2025-02-05 ENCOUNTER — OFFICE VISIT (OUTPATIENT)
Dept: PRIMARY CARE | Facility: CLINIC | Age: 30
End: 2025-02-05
Payer: COMMERCIAL

## 2025-02-05 VITALS
SYSTOLIC BLOOD PRESSURE: 124 MMHG | TEMPERATURE: 97.1 F | WEIGHT: 160 LBS | DIASTOLIC BLOOD PRESSURE: 70 MMHG | HEART RATE: 83 BPM | OXYGEN SATURATION: 98 % | HEIGHT: 64 IN | RESPIRATION RATE: 14 BRPM | BODY MASS INDEX: 27.31 KG/M2

## 2025-02-05 DIAGNOSIS — L60.0 INGROWN NAIL: ICD-10-CM

## 2025-02-05 DIAGNOSIS — F41.9 ANXIETY: Primary | ICD-10-CM

## 2025-02-05 PROCEDURE — 3008F BODY MASS INDEX DOCD: CPT | Performed by: FAMILY MEDICINE

## 2025-02-05 PROCEDURE — 99213 OFFICE O/P EST LOW 20 MIN: CPT | Performed by: FAMILY MEDICINE

## 2025-02-05 PROCEDURE — 1036F TOBACCO NON-USER: CPT | Performed by: FAMILY MEDICINE

## 2025-02-05 RX ORDER — ESCITALOPRAM OXALATE 20 MG/1
20 TABLET ORAL DAILY
Qty: 90 TABLET | Refills: 3 | Status: SHIPPED | OUTPATIENT
Start: 2025-02-05

## 2025-02-05 ASSESSMENT — ENCOUNTER SYMPTOMS
OCCASIONAL FEELINGS OF UNSTEADINESS: 0
LOSS OF SENSATION IN FEET: 0
DEPRESSION: 0

## 2025-02-05 ASSESSMENT — PATIENT HEALTH QUESTIONNAIRE - PHQ9
1. LITTLE INTEREST OR PLEASURE IN DOING THINGS: NOT AT ALL
SUM OF ALL RESPONSES TO PHQ9 QUESTIONS 1 AND 2: 0
2. FEELING DOWN, DEPRESSED OR HOPELESS: NOT AT ALL

## 2025-02-05 NOTE — PROGRESS NOTES
"Subjective   Patient ID:  Tiffany Grijalva is a 29 y.o. female patient who presents today for Anxiety (Discuss medication ).    Anxiety: Patient reports that she has been doing well overall but recently started taking 10 mg of Lexapro. However, she states that the medication made her feel worse, leading to a lack of motivation and feelings of laziness. In the past few months she had lower energy levels than previous. She did not experience any significant side effects. Will continue current regimen.     Patient presents with ingrown toenail in left great toe which feels sore. Will refer to podiatry.     Objective   Vitals:  /70   Pulse 83   Temp 36.2 °C (97.1 °F)   Resp 14   Ht 1.626 m (5' 4\")   Wt 72.6 kg (160 lb)   SpO2 98%   BMI 27.46 kg/m²       Physical Exam  Vitals reviewed.   Constitutional:       Appearance: Normal appearance.   Cardiovascular:      Rate and Rhythm: Normal rate and regular rhythm.      Pulses: Normal pulses.      Heart sounds: Normal heart sounds.   Pulmonary:      Effort: Pulmonary effort is normal.      Breath sounds: Normal breath sounds.   Abdominal:      General: Abdomen is flat.      Palpations: Abdomen is soft.   Musculoskeletal:      Cervical back: Normal range of motion and neck supple.      Comments: Left Ingrown toenail in great toe, medial aspect. Area is tender.    Neurological:      Mental Status: She is alert.       Assessment/Plan   Problem List Items Addressed This Visit          Mental Health    Anxiety    Current Assessment & Plan      Is stable, continue with current treatment.           Relevant Medications    escitalopram (Lexapro) 20 mg tablet     Other Visit Diagnoses       Ingrown nail    -  Primary    Relevant Orders    Referral to Podiatry            Follow up in: 6 month(s) or sooner if needed without labs prior.       Scribe Attestation  By signing my name below, Sangeeta FRANKLIN , [employer] attest that this documentation has been prepared under the " direction and in the presence of Cabrera Rebollar MD.

## 2025-05-29 ENCOUNTER — APPOINTMENT (OUTPATIENT)
Dept: PRIMARY CARE | Facility: CLINIC | Age: 30
End: 2025-05-29
Payer: COMMERCIAL

## 2025-05-29 VITALS
DIASTOLIC BLOOD PRESSURE: 78 MMHG | TEMPERATURE: 97.6 F | RESPIRATION RATE: 16 BRPM | BODY MASS INDEX: 26.56 KG/M2 | OXYGEN SATURATION: 97 % | SYSTOLIC BLOOD PRESSURE: 126 MMHG | HEART RATE: 64 BPM | WEIGHT: 155.6 LBS | HEIGHT: 64 IN

## 2025-05-29 DIAGNOSIS — F33.1 MODERATE EPISODE OF RECURRENT MAJOR DEPRESSIVE DISORDER: Primary | ICD-10-CM

## 2025-05-29 DIAGNOSIS — F41.9 ANXIETY: ICD-10-CM

## 2025-05-29 PROBLEM — R43.2 LOSS OF TASTE: Status: RESOLVED | Noted: 2023-05-08 | Resolved: 2025-05-29

## 2025-05-29 PROBLEM — L60.0 INGROWN NAIL: Status: RESOLVED | Noted: 2025-02-13 | Resolved: 2025-05-29

## 2025-05-29 PROBLEM — N39.0 RECURRENT URINARY TRACT INFECTION: Status: ACTIVE | Noted: 2025-05-29

## 2025-05-29 PROBLEM — H65.03 BILATERAL ACUTE SEROUS OTITIS MEDIA: Status: RESOLVED | Noted: 2023-06-21 | Resolved: 2025-05-29

## 2025-05-29 PROBLEM — J01.90 ACUTE BACTERIAL SINUSITIS: Status: RESOLVED | Noted: 2025-05-29 | Resolved: 2025-05-29

## 2025-05-29 PROBLEM — B96.89 ACUTE BACTERIAL SINUSITIS: Status: RESOLVED | Noted: 2025-05-29 | Resolved: 2025-05-29

## 2025-05-29 PROBLEM — L60.9 DISEASE OF NAIL: Status: RESOLVED | Noted: 2025-05-29 | Resolved: 2025-05-29

## 2025-05-29 PROBLEM — L60.9 NAIL ABNORMALITY: Status: RESOLVED | Noted: 2023-05-08 | Resolved: 2025-05-29

## 2025-05-29 PROBLEM — M21.619 BUNION: Status: ACTIVE | Noted: 2025-02-13

## 2025-05-29 PROBLEM — L03.039 PARONYCHIA OF GREAT TOE: Status: ACTIVE | Noted: 2025-02-13

## 2025-05-29 PROBLEM — R39.9 SYMPTOMS INVOLVING URINARY SYSTEM: Status: RESOLVED | Noted: 2025-05-29 | Resolved: 2025-05-29

## 2025-05-29 PROBLEM — M79.675 PAIN IN TOE OF LEFT FOOT: Status: ACTIVE | Noted: 2025-02-13

## 2025-05-29 PROBLEM — J02.9 ACUTE PHARYNGITIS: Status: RESOLVED | Noted: 2023-06-21 | Resolved: 2025-05-29

## 2025-05-29 PROBLEM — L03.90 CELLULITIS OF SKIN: Status: ACTIVE | Noted: 2024-04-05

## 2025-05-29 PROCEDURE — 1036F TOBACCO NON-USER: CPT | Performed by: FAMILY MEDICINE

## 2025-05-29 PROCEDURE — 3008F BODY MASS INDEX DOCD: CPT | Performed by: FAMILY MEDICINE

## 2025-05-29 PROCEDURE — 99213 OFFICE O/P EST LOW 20 MIN: CPT | Performed by: FAMILY MEDICINE

## 2025-05-29 RX ORDER — MUPIROCIN 20 MG/G
OINTMENT TOPICAL
COMMUNITY
Start: 2025-02-13

## 2025-05-29 RX ORDER — FLUOXETINE 20 MG/1
20 CAPSULE ORAL DAILY
Qty: 30 CAPSULE | Refills: 3 | Status: SHIPPED | OUTPATIENT
Start: 2025-05-29 | End: 2025-09-26

## 2025-05-29 ASSESSMENT — PATIENT HEALTH QUESTIONNAIRE - PHQ9
SUM OF ALL RESPONSES TO PHQ9 QUESTIONS 1 AND 2: 2
1. LITTLE INTEREST OR PLEASURE IN DOING THINGS: NOT AT ALL
2. FEELING DOWN, DEPRESSED OR HOPELESS: MORE THAN HALF THE DAYS

## 2025-05-29 ASSESSMENT — ENCOUNTER SYMPTOMS
OCCASIONAL FEELINGS OF UNSTEADINESS: 0
DEPRESSION: 0
LOSS OF SENSATION IN FEET: 0

## 2025-05-30 NOTE — ASSESSMENT & PLAN NOTE
Depression stable bit with the patient possibly obsessing on food we will discontinue escitalopram and start fluoxetine.

## 2025-07-14 ENCOUNTER — PATIENT MESSAGE (OUTPATIENT)
Dept: PRIMARY CARE | Facility: CLINIC | Age: 30
End: 2025-07-14
Payer: COMMERCIAL

## 2025-07-14 DIAGNOSIS — F33.1 MODERATE EPISODE OF RECURRENT MAJOR DEPRESSIVE DISORDER: ICD-10-CM

## 2025-07-15 RX ORDER — FLUOXETINE 20 MG/1
20 CAPSULE ORAL DAILY
Qty: 30 CAPSULE | Refills: 3 | Status: SHIPPED | OUTPATIENT
Start: 2025-07-15 | End: 2025-11-12

## 2025-08-07 ENCOUNTER — APPOINTMENT (OUTPATIENT)
Dept: PRIMARY CARE | Facility: CLINIC | Age: 30
End: 2025-08-07
Payer: COMMERCIAL

## 2025-09-02 ENCOUNTER — APPOINTMENT (OUTPATIENT)
Dept: BEHAVIORAL HEALTH | Facility: CLINIC | Age: 30
End: 2025-09-02
Payer: COMMERCIAL